# Patient Record
Sex: FEMALE | Race: WHITE | Employment: UNEMPLOYED | URBAN - METROPOLITAN AREA
[De-identification: names, ages, dates, MRNs, and addresses within clinical notes are randomized per-mention and may not be internally consistent; named-entity substitution may affect disease eponyms.]

---

## 2018-07-10 ENCOUNTER — APPOINTMENT (OUTPATIENT)
Dept: ULTRASOUND IMAGING | Age: 58
End: 2018-07-10
Payer: COMMERCIAL

## 2018-07-10 ENCOUNTER — APPOINTMENT (OUTPATIENT)
Dept: GENERAL RADIOLOGY | Age: 58
End: 2018-07-10
Payer: COMMERCIAL

## 2018-07-10 ENCOUNTER — HOSPITAL ENCOUNTER (EMERGENCY)
Age: 58
Discharge: HOME | End: 2018-07-11
Attending: EMERGENCY MEDICINE
Payer: COMMERCIAL

## 2018-07-10 VITALS
BODY MASS INDEX: 34.53 KG/M2 | RESPIRATION RATE: 16 BRPM | OXYGEN SATURATION: 95 % | WEIGHT: 220 LBS | DIASTOLIC BLOOD PRESSURE: 65 MMHG | HEART RATE: 65 BPM | HEIGHT: 67 IN | SYSTOLIC BLOOD PRESSURE: 105 MMHG | TEMPERATURE: 97.7 F

## 2018-07-10 DIAGNOSIS — R60.9 PERIPHERAL EDEMA: Primary | ICD-10-CM

## 2018-07-10 LAB
ANION GAP SERPL CALCULATED.3IONS-SCNC: 15 MMOL/L (ref 7–16)
BUN BLDV-MCNC: 23 MG/DL (ref 6–20)
CALCIUM SERPL-MCNC: 9.7 MG/DL (ref 8.6–10.2)
CHLORIDE BLD-SCNC: 95 MMOL/L (ref 98–107)
CO2: 25 MMOL/L (ref 22–29)
CREAT SERPL-MCNC: 0.7 MG/DL (ref 0.5–1)
EKG ATRIAL RATE: 61 BPM
EKG P AXIS: 58 DEGREES
EKG P-R INTERVAL: 182 MS
EKG Q-T INTERVAL: 432 MS
EKG QRS DURATION: 90 MS
EKG QTC CALCULATION (BAZETT): 434 MS
EKG R AXIS: 7 DEGREES
EKG T AXIS: 32 DEGREES
EKG VENTRICULAR RATE: 61 BPM
GFR AFRICAN AMERICAN: >60
GFR NON-AFRICAN AMERICAN: >60 ML/MIN/1.73
GLUCOSE BLD-MCNC: 183 MG/DL (ref 74–109)
HCT VFR BLD CALC: 42.8 % (ref 34–48)
HEMOGLOBIN: 14.4 G/DL (ref 11.5–15.5)
MCH RBC QN AUTO: 30.2 PG (ref 26–35)
MCHC RBC AUTO-ENTMCNC: 33.6 % (ref 32–34.5)
MCV RBC AUTO: 89.7 FL (ref 80–99.9)
PDW BLD-RTO: 12.5 FL (ref 11.5–15)
PLATELET # BLD: 284 E9/L (ref 130–450)
PMV BLD AUTO: 11.8 FL (ref 7–12)
POTASSIUM SERPL-SCNC: 6.1 MMOL/L (ref 3.5–5)
RBC # BLD: 4.77 E12/L (ref 3.5–5.5)
SODIUM BLD-SCNC: 135 MMOL/L (ref 132–146)
WBC # BLD: 9.4 E9/L (ref 4.5–11.5)

## 2018-07-10 PROCEDURE — 85027 COMPLETE CBC AUTOMATED: CPT

## 2018-07-10 PROCEDURE — 93005 ELECTROCARDIOGRAM TRACING: CPT | Performed by: PHYSICIAN ASSISTANT

## 2018-07-10 PROCEDURE — 99283 EMERGENCY DEPT VISIT LOW MDM: CPT

## 2018-07-10 PROCEDURE — 93971 EXTREMITY STUDY: CPT

## 2018-07-10 PROCEDURE — 71046 X-RAY EXAM CHEST 2 VIEWS: CPT

## 2018-07-10 PROCEDURE — 80048 BASIC METABOLIC PNL TOTAL CA: CPT

## 2018-07-10 PROCEDURE — 96374 THER/PROPH/DIAG INJ IV PUSH: CPT

## 2018-07-10 PROCEDURE — 6360000002 HC RX W HCPCS: Performed by: EMERGENCY MEDICINE

## 2018-07-10 RX ORDER — ARIPIPRAZOLE 10 MG/1
10 TABLET ORAL DAILY
COMMUNITY
End: 2021-03-11

## 2018-07-10 RX ORDER — KETOROLAC TROMETHAMINE 30 MG/ML
15 INJECTION, SOLUTION INTRAMUSCULAR; INTRAVENOUS ONCE
Status: COMPLETED | OUTPATIENT
Start: 2018-07-10 | End: 2018-07-10

## 2018-07-10 RX ORDER — LISINOPRIL 10 MG/1
10 TABLET ORAL DAILY
COMMUNITY
End: 2021-04-05 | Stop reason: ALTCHOICE

## 2018-07-10 RX ADMIN — KETOROLAC TROMETHAMINE 15 MG: 30 INJECTION, SOLUTION INTRAMUSCULAR at 23:48

## 2018-07-10 ASSESSMENT — PAIN SCALES - GENERAL
PAINLEVEL_OUTOF10: 8
PAINLEVEL_OUTOF10: 3

## 2018-07-11 LAB
ANION GAP SERPL CALCULATED.3IONS-SCNC: 15 MMOL/L (ref 7–16)
BUN BLDV-MCNC: 25 MG/DL (ref 6–20)
CALCIUM SERPL-MCNC: 9.7 MG/DL (ref 8.6–10.2)
CHLORIDE BLD-SCNC: 97 MMOL/L (ref 98–107)
CO2: 25 MMOL/L (ref 22–29)
CREAT SERPL-MCNC: 0.8 MG/DL (ref 0.5–1)
GFR AFRICAN AMERICAN: >60
GFR NON-AFRICAN AMERICAN: >60 ML/MIN/1.73
GLUCOSE BLD-MCNC: 164 MG/DL (ref 74–109)
POTASSIUM SERPL-SCNC: 3.3 MMOL/L (ref 3.5–5)
SODIUM BLD-SCNC: 137 MMOL/L (ref 132–146)

## 2018-07-11 PROCEDURE — 6370000000 HC RX 637 (ALT 250 FOR IP): Performed by: EMERGENCY MEDICINE

## 2018-07-11 PROCEDURE — 80048 BASIC METABOLIC PNL TOTAL CA: CPT

## 2018-07-11 RX ORDER — POTASSIUM CHLORIDE 1.5 G/1.77G
40 POWDER, FOR SOLUTION ORAL ONCE
Status: COMPLETED | OUTPATIENT
Start: 2018-07-11 | End: 2018-07-11

## 2018-07-11 RX ADMIN — POTASSIUM CHLORIDE 40 MEQ: 1.5 POWDER, FOR SOLUTION ORAL at 01:16

## 2018-07-11 ASSESSMENT — PAIN SCALES - GENERAL: PAINLEVEL_OUTOF10: 0

## 2018-07-11 ASSESSMENT — PAIN DESCRIPTION - PROGRESSION: CLINICAL_PROGRESSION: RESOLVED

## 2018-07-11 NOTE — ED PROVIDER NOTES
Department of Emergency Medicine   ED  Provider Note  Admit Date/RoomTime: 7/10/2018 10:29 PM  ED Room: 02/02              7/10/18  10:37 PM    History of Present Illness:   Ana Mckeon is a 62 y.o. female presenting to the ED for right leg swelling, beginning a few days ago. The complaint has been persistent, mild in severity, and worsened by nothing. The patient reports that she flew to [facility] several weeks ago and then returned last week. She reports that after arrival to earlier she had bilateral leg swelling that resolved after she was there and walked around. She reports that she flew home and the peripheral edema returned. She reports it resolved and her left leg but her right leg is still swollen. She reports that she has mild pain in her calf as well. She denies fevers or chills. She denies any discoloration of her leg. She denies any neurovascular symptoms such as numbness or tingling or weakness. She denies chest pain or shortness of breath. Review of Systems:   Pertinent positives and negatives are stated within HPI, all other systems reviewed and are negative.          --------------------------------------------- PAST HISTORY ---------------------------------------------  Past Medical History:  has a past medical history of Cancer ([facility] [address] Past Surgical History:  has a past surgical history that includes Mastectomy; Hysterectomy; and  section. Social History:  reports that she has never smoked. She has never used smokeless tobacco. She reports that she drinks alcohol. Family History: family history is not on file. The patients home medications have been reviewed. Allergies: Patient has no known allergies.     -------------------------------------------------- RESULTS -------------------------------------------------  All laboratory and radiology results have been personally reviewed by myself   LABS:  Results for orders placed or performed during the hospital encounter of 18/67/89   Basic metabolic panel   Result Value Ref Range    Sodium 135 132 - 146 mmol/L    Potassium 6.1 (H) 3.5 - 5.0 mmol/L    Chloride 95 (L) 98 - 107 mmol/L    CO2 25 22 - 29 mmol/L    Anion Gap 15 7 - 16 mmol/L    Glucose 183 (H) 74 - 109 mg/dL    BUN 23 (H) 6 - 20 mg/dL    CREATININE 0.7 0.5 - 1.0 mg/dL    GFR Non-African American >60 >=60 mL/min/1.73    GFR African American >60     Calcium 9.7 8.6 - 10.2 mg/dL   CBC   Result Value Ref Range    WBC 9.4 4.5 - 11.5 E9/L    RBC 4.77 3.50 - 5.50 E12/L    Hemoglobin 14.4 11.5 - 15.5 g/dL    Hematocrit 42.8 34.0 - 48.0 %    MCV 89.7 80.0 - 99.9 fL    MCH 30.2 26.0 - 35.0 pg    MCHC 33.6 32.0 - 34.5 %    RDW 12.5 11.5 - 15.0 fL    Platelets 756 868 - 104 E9/L    MPV 11.8 7.0 - 12.0 fL   EKG 12 Lead   Result Value Ref Range    Ventricular Rate 61 BPM    Atrial Rate 61 BPM    P-R Interval 182 ms    QRS Duration 90 ms    Q-T Interval 432 ms    QTc Calculation (Bazett) 434 ms    P Axis 58 degrees    R Axis 7 degrees    T Axis 32 degrees       RADIOLOGY:  Interpreted by Radiologist.  US DUP LOWER EXTREMITY RIGHT SHAUNNA   Final Result      XR CHEST STANDARD (2 VW)   Final Result        EKG Interpretation  Interpreted by emergency department physician, Dr. Shonda Saunders     7/11/18  Time: 2352    Rhythm: normal sinus   Rate: normal  Axis: normal  Conduction: normal  ST Segments: no acute change  T Waves: no acute change    Clinical Impression: Sinus rhythm, no acute ischemic changes    Comparison to Old EKG  No old EKG        ------------------------- NURSING NOTES AND VITALS REVIEWED ---------------------------   The nursing notes within the ED encounter and vital signs as below have been reviewed.    /65   Pulse 65   Temp 97.7 °F (36.5 °C)   Resp 16   Ht 5' 7\" (1.702 m)   Wt 220 lb (99.8 kg)   SpO2 95%   BMI 34.46 kg/m²   Oxygen Saturation Interpretation: Normal      ---------------------------------------------------PHYSICAL EXAM--------------------------------------    Constitutional/General: Alert and oriented x3, well appearing, non toxic in NAD  Head: Normocephalic and atraumatic  Eyes: PERRL, EOMI, conjunctiva normal, sclera non icteric  Mouth: Oropharynx clear, handling secretions  Neck: Supple, full ROM, non tender to palpation in the midline, no stridor, no crepitus, no meningeal signs  Respiratory: Lungs clear to auscultation bilaterally, no wheezes, rales, or rhonchi. Not in respiratory distress  Cardiovascular:  Regular rate. Regular rhythm. No murmurs, gallops, or rubs. 2+ distal pulses  Musculoskeletal: Moves all extremities x 4. Warm and well perfused, no clubbing, cyanosis, or edema. Capillary refill <3 seconds. Mild tenderness to palpation along the right lower leg including the right calf. No palpable cords. There is some very slight swelling compared to the left Neurovascularly intact distally. 2+ DP/PT pulses. Capillary refill <3 secondary. Warm and well perfused. Sural, saphenous, deep peroneal, peroneal, and tibial nerves intact. Sensation intact. 5/5 strength. Achilies tendon intact. No evidence of compartment syndrome. Integument: skin warm and dry. No rashes. Lymphatic: no lymphadenopathy noted  Neurologic: GCS 15, no focal deficits, symmetric strength 5/5 in the upper and lower extremities bilaterally  Psychiatric: Normal Affect      ------------------------------ ED COURSE/MEDICAL DECISION MAKING----------------------  Medications   ketorolac (TORADOL) injection 15 mg (15 mg Intravenous Given 7/10/18 8073)         Medical Decision Making:    US neg for DVT. Likely dependent edema. Advised to elevated the legs and use compression stockings and to return for repeat US if still with sx in the next 7-10 days. Counseling:    The emergency provider has spoken with the patient and spouse/SO and discussed todays results, in addition to providing specific details for the plan of care and counseling regarding the diagnosis and prognosis. Questions are answered at this time and they are agreeable with the plan.      --------------------------------- IMPRESSION AND DISPOSITION ---------------------------------    IMPRESSION  1.  Peripheral edema        DISPOSITION  Disposition: Discharge to home  Patient condition is good                Belkis Li MD  07/11/18 0918

## 2021-03-08 ENCOUNTER — IMMUNIZATION (OUTPATIENT)
Dept: PRIMARY CARE CLINIC | Age: 61
End: 2021-03-08
Payer: COMMERCIAL

## 2021-03-08 PROCEDURE — 0031A COVID-19, J&J VACCINE, PF, 0.5 ML DOSE: CPT | Performed by: NURSE PRACTITIONER

## 2021-03-08 PROCEDURE — 91303 COVID-19, J&J VACCINE, PF, 0.5 ML DOSE: CPT | Performed by: NURSE PRACTITIONER

## 2021-03-11 ENCOUNTER — APPOINTMENT (OUTPATIENT)
Dept: CT IMAGING | Age: 61
End: 2021-03-11
Payer: COMMERCIAL

## 2021-03-11 ENCOUNTER — HOSPITAL ENCOUNTER (EMERGENCY)
Age: 61
Discharge: HOME | End: 2021-03-12
Attending: EMERGENCY MEDICINE
Payer: COMMERCIAL

## 2021-03-11 DIAGNOSIS — N10 ACUTE PYELONEPHRITIS: Primary | ICD-10-CM

## 2021-03-11 LAB
ALBUMIN SERPL-MCNC: 4.4 G/DL (ref 3.5–5.2)
ALP BLD-CCNC: 100 U/L (ref 35–104)
ALT SERPL-CCNC: 14 U/L (ref 0–32)
ANION GAP SERPL CALCULATED.3IONS-SCNC: 7 MMOL/L (ref 7–16)
AST SERPL-CCNC: 15 U/L (ref 0–31)
BASOPHILS ABSOLUTE: 0.04 E9/L (ref 0–0.2)
BASOPHILS RELATIVE PERCENT: 0.4 % (ref 0–2)
BILIRUB SERPL-MCNC: 0.2 MG/DL (ref 0–1.2)
BUN BLDV-MCNC: 22 MG/DL (ref 8–23)
CALCIUM SERPL-MCNC: 9.5 MG/DL (ref 8.6–10.2)
CHLORIDE BLD-SCNC: 104 MMOL/L (ref 98–107)
CO2: 29 MMOL/L (ref 22–29)
CREAT SERPL-MCNC: 1.3 MG/DL (ref 0.5–1)
EOSINOPHILS ABSOLUTE: 0.16 E9/L (ref 0.05–0.5)
EOSINOPHILS RELATIVE PERCENT: 1.8 % (ref 0–6)
GFR AFRICAN AMERICAN: 50
GFR NON-AFRICAN AMERICAN: 42 ML/MIN/1.73
GLUCOSE BLD-MCNC: 122 MG/DL (ref 74–99)
HCT VFR BLD CALC: 42.3 % (ref 34–48)
HEMOGLOBIN: 13.5 G/DL (ref 11.5–15.5)
IMMATURE GRANULOCYTES #: 0.01 E9/L
IMMATURE GRANULOCYTES %: 0.1 % (ref 0–5)
LYMPHOCYTES ABSOLUTE: 3.01 E9/L (ref 1.5–4)
LYMPHOCYTES RELATIVE PERCENT: 33.4 % (ref 20–42)
MCH RBC QN AUTO: 28.7 PG (ref 26–35)
MCHC RBC AUTO-ENTMCNC: 31.9 % (ref 32–34.5)
MCV RBC AUTO: 90 FL (ref 80–99.9)
MONOCYTES ABSOLUTE: 1.24 E9/L (ref 0.1–0.95)
MONOCYTES RELATIVE PERCENT: 13.8 % (ref 2–12)
NEUTROPHILS ABSOLUTE: 4.55 E9/L (ref 1.8–7.3)
NEUTROPHILS RELATIVE PERCENT: 50.5 % (ref 43–80)
PDW BLD-RTO: 12.3 FL (ref 11.5–15)
PLATELET # BLD: 261 E9/L (ref 130–450)
PMV BLD AUTO: 11.5 FL (ref 7–12)
POTASSIUM SERPL-SCNC: 3.6 MMOL/L (ref 3.5–5)
RBC # BLD: 4.7 E12/L (ref 3.5–5.5)
SODIUM BLD-SCNC: 140 MMOL/L (ref 132–146)
TOTAL PROTEIN: 7 G/DL (ref 6.4–8.3)
WBC # BLD: 9 E9/L (ref 4.5–11.5)

## 2021-03-11 PROCEDURE — 2580000003 HC RX 258: Performed by: EMERGENCY MEDICINE

## 2021-03-11 PROCEDURE — 85025 COMPLETE CBC W/AUTO DIFF WBC: CPT

## 2021-03-11 PROCEDURE — 81001 URINALYSIS AUTO W/SCOPE: CPT

## 2021-03-11 PROCEDURE — 99283 EMERGENCY DEPT VISIT LOW MDM: CPT

## 2021-03-11 PROCEDURE — 87088 URINE BACTERIA CULTURE: CPT

## 2021-03-11 PROCEDURE — 6360000002 HC RX W HCPCS: Performed by: EMERGENCY MEDICINE

## 2021-03-11 PROCEDURE — 80053 COMPREHEN METABOLIC PANEL: CPT

## 2021-03-11 PROCEDURE — 74176 CT ABD & PELVIS W/O CONTRAST: CPT

## 2021-03-11 RX ORDER — 0.9 % SODIUM CHLORIDE 0.9 %
1000 INTRAVENOUS SOLUTION INTRAVENOUS ONCE
Status: COMPLETED | OUTPATIENT
Start: 2021-03-12 | End: 2021-03-12

## 2021-03-11 RX ORDER — NITROFURANTOIN 25; 75 MG/1; MG/1
100 CAPSULE ORAL 2 TIMES DAILY
COMMUNITY
End: 2021-04-05 | Stop reason: ALTCHOICE

## 2021-03-11 RX ADMIN — CEFTRIAXONE 1000 MG: 1 INJECTION, POWDER, FOR SOLUTION INTRAMUSCULAR; INTRAVENOUS at 23:59

## 2021-03-11 ASSESSMENT — PAIN DESCRIPTION - DESCRIPTORS: DESCRIPTORS: DULL

## 2021-03-11 ASSESSMENT — PAIN DESCRIPTION - ORIENTATION: ORIENTATION: RIGHT

## 2021-03-11 ASSESSMENT — ENCOUNTER SYMPTOMS
SHORTNESS OF BREATH: 0
NAUSEA: 0
BACK PAIN: 0
ABDOMINAL PAIN: 0
BLOOD IN STOOL: 0
COUGH: 0
COLOR CHANGE: 0
RHINORRHEA: 0
VOMITING: 0

## 2021-03-11 ASSESSMENT — PAIN DESCRIPTION - LOCATION: LOCATION: FLANK

## 2021-03-12 VITALS
TEMPERATURE: 98.4 F | BODY MASS INDEX: 31.55 KG/M2 | DIASTOLIC BLOOD PRESSURE: 59 MMHG | RESPIRATION RATE: 16 BRPM | HEIGHT: 67 IN | SYSTOLIC BLOOD PRESSURE: 157 MMHG | HEART RATE: 67 BPM | OXYGEN SATURATION: 96 % | WEIGHT: 201 LBS

## 2021-03-12 LAB
BACTERIA: ABNORMAL /HPF
BILIRUBIN URINE: ABNORMAL
BLOOD, URINE: NEGATIVE
CLARITY: CLEAR
COLOR: ABNORMAL
EPITHELIAL CELLS, UA: ABNORMAL /HPF
GLUCOSE URINE: 100 MG/DL
KETONES, URINE: ABNORMAL MG/DL
LEUKOCYTE ESTERASE, URINE: NEGATIVE
NITRITE, URINE: NEGATIVE
PH UA: 5 (ref 5–9)
PROTEIN UA: ABNORMAL MG/DL
RBC UA: ABNORMAL /HPF (ref 0–2)
SPECIFIC GRAVITY UA: 1.02 (ref 1–1.03)
UROBILINOGEN, URINE: 2 E.U./DL
WBC UA: ABNORMAL /HPF (ref 0–5)

## 2021-03-12 PROCEDURE — 96365 THER/PROPH/DIAG IV INF INIT: CPT

## 2021-03-12 PROCEDURE — 2580000003 HC RX 258: Performed by: EMERGENCY MEDICINE

## 2021-03-12 RX ORDER — CEFDINIR 300 MG/1
300 CAPSULE ORAL 2 TIMES DAILY
Qty: 28 CAPSULE | Refills: 0 | Status: SHIPPED | OUTPATIENT
Start: 2021-03-12 | End: 2021-03-26

## 2021-03-12 RX ADMIN — SODIUM CHLORIDE 1000 ML: 9 INJECTION, SOLUTION INTRAVENOUS at 00:05

## 2021-03-12 NOTE — ED PROVIDER NOTES
ED PROVIDER NOTE    Chief Complaint   Patient presents with    Flank Pain     Been treated on Tuesday and getting worse    Urinary Tract Infection       HPI:  3/11/21,   Time: 9:55 PM ROMAINE Vo is a 61 y.o. female presenting to the ED for flank pain. Gradual onset x 3d, progressively worsening, R>L, dull ache, constant, nonradiating, no aggravating/alleviating factors. Associated dysuria, urgency, frequency. Treated w/ macrobid by PCP for UTI, took it x 2 days, no improvement. Also taking azo. No fever, chills, nausea, vomiting. Normal po intake. No decreased urine output. Chart review: hx of HTN, breast ca, anxiety, hysterectomy,     Review of Systems:     Review of Systems   Constitutional: Negative for appetite change, chills and fever. HENT: Negative for congestion and rhinorrhea. Eyes: Negative for visual disturbance. Respiratory: Negative for cough and shortness of breath. Cardiovascular: Negative for chest pain. Gastrointestinal: Negative for abdominal pain, blood in stool, nausea and vomiting. Genitourinary: Positive for dysuria, flank pain, frequency and urgency. Negative for decreased urine volume, difficulty urinating and hematuria. Musculoskeletal: Negative for back pain and neck pain. Skin: Negative for color change.    Neurological: Negative for dizziness, syncope, weakness, light-headedness, numbness and headaches.         --------------------------------------------- PAST HISTORY ---------------------------------------------  Past Medical History:   Past Medical History:   Diagnosis Date    Cancer [facility])     breast cancer       Past Surgical History:   Past Surgical History:   Procedure Laterality Date     SECTION      HYSTERECTOMY      JOINT REPLACEMENT Left 2020    MASTECTOMY         Social History:   Social History     Socioeconomic History    Marital status:      Spouse name: None    Number of children: None    Years of education: None    Highest education level: None   Occupational History    None   Social Needs    Financial resource strain: None    Food insecurity     Worry: None     Inability: None    Transportation needs     Medical: None     Non-medical: None   Tobacco Use    Smoking status: Never Smoker    Smokeless tobacco: Never Used   Substance and Sexual Activity    Alcohol use: Yes    Drug use: Never    Sexual activity: None   Lifestyle    Physical activity     Days per week: None     Minutes per session: None    Stress: None   Relationships    Social connections     Talks on phone: None     Gets together: None     Attends Spiritism service: None     Active member of club or organization: None     Attends meetings of clubs or organizations: None     Relationship status: None    Intimate partner violence     Fear of current or ex partner: None     Emotionally abused: None     Physically abused: None     Forced sexual activity: None   Other Topics Concern    None   Social History Narrative    None       Family History:   History reviewed. No pertinent family history. The patients home medications have been reviewed. Allergies: Allergies   Allergen Reactions    Oxycodone      nausea, nightmares           ---------------------------------------------------PHYSICAL EXAM--------------------------------------    /70   Pulse 62   Temp 97.3 °F (36.3 °C) (Infrared)   Resp 16   Ht 5' 7\" (1.702 m)   Wt 201 lb (91.2 kg)   SpO2 98%   BMI 31.48 kg/m²     Physical Exam  Vitals signs and nursing note reviewed. Constitutional:       General: She is not in acute distress. Appearance: She is not toxic-appearing. HENT:      Mouth/Throat:      Mouth: Mucous membranes are moist.   Eyes:      General: No scleral icterus. Extraocular Movements: Extraocular movements intact. Pupils: Pupils are equal, round, and reactive to light.    Neck:      Musculoskeletal: Normal range of motion and neck supple. Cardiovascular:      Rate and Rhythm: Normal rate and regular rhythm. Pulses: Normal pulses. Heart sounds: Normal heart sounds. No murmur. Pulmonary:      Effort: Pulmonary effort is normal. No respiratory distress. Breath sounds: Normal breath sounds. No wheezing or rales. Abdominal:      General: There is no distension. Palpations: Abdomen is soft. Tenderness: There is no abdominal tenderness. There is right CVA tenderness. There is no left CVA tenderness, guarding or rebound. Musculoskeletal: Normal range of motion. General: No swelling or tenderness. Comments: Radial, DP, and PT pulses intact bilaterally. Skin:     General: Skin is warm and dry. Neurological:      Mental Status: She is alert and oriented to person, place, and time. Comments: Strength 5/5 and sensation grossly intact to light touch and equal bilaterally throughout all extremities            -------------------------------------------------- RESULTS -------------------------------------------------  I have personally reviewed all laboratory and imaging results for this patient. Results are listed below.      LABS:  Labs Reviewed   URINALYSIS WITH MICROSCOPIC - Abnormal; Notable for the following components:       Result Value    Color, UA DARK YELLOW (*)     Glucose, Ur 100 (*)     Bilirubin Urine SMALL (*)     Ketones, Urine TRACE (*)     Urobilinogen, Urine 2.0 (*)     Nitrite, Urine POSITIVE (*)     All other components within normal limits   CBC WITH AUTO DIFFERENTIAL - Abnormal; Notable for the following components:    MCHC 31.9 (*)     Monocytes % 13.8 (*)     Monocytes Absolute 1.24 (*)     All other components within normal limits   COMPREHENSIVE METABOLIC PANEL - Abnormal; Notable for the following components:    Glucose 122 (*)     CREATININE 1.3 (*)     All other components within normal limits   CULTURE, URINE       RADIOLOGY:  Interpreted personally and by Radiologist.  [address] ABDOMEN PELVIS WO CONTRAST Additional Contrast? None   Final Result   Dilated appendix with no other signs of inflammation, which can be seen in   early appendicitis. Please correlate clinically and consider PO and IV   contrast enhanced study if clinically appropriate. Multiple hepatic cysts. Right breast implant. No evidence of renal stones or obstructive uropathy.                 ------------------------- NURSING NOTES AND VITALS REVIEWED ---------------------------   The nursing notes within the ED encounter and vital signs as below have been reviewed by myself. /70   Pulse 62   Temp 97.3 °F (36.3 °C) (Infrared)   Resp 16   Ht 5' 7\" (1.702 m)   Wt 201 lb (91.2 kg)   SpO2 98%   BMI 31.48 kg/m²   Oxygen Saturation Interpretation: Normal    The patients available past medical records and past encounters were reviewed. ------------------------------ ED COURSE/MEDICAL DECISION MAKING----------------------  Medications   cefTRIAXone (ROCEPHIN) 1,000 mg in sterile water 10 mL IV syringe (has no administration in time range)   0.9 % sodium chloride bolus (has no administration in time range)     Counseling: The emergency provider has spoken with the patient and discussed todays results, in addition to providing specific details for the plan of care and counseling regarding the diagnosis and prognosis. Questions are answered at this time and they are agreeable with the plan. ED Course/Medical Decision Makin y.o. female here with flank pain in setting of UTI treated as outpatient w/ macrobid. Non-toxic appearing, afebrile, hemodynamically stable, and in no acute distress. UA not strongly suggestive of UTI, maybe partially treated by abx. Mild DARLENE. No leukocytosis. R flank/CVA tenderness. No abdominal tenderness. CT shows no obstructive uropathy, does show dilated appendix w/o signs of inflammation. Reevaluation still no abdominal tenderness to palpation.  No fever or leukocytosis. Discussed CT findings with patient and she understands that this could be early appendicitis. However at this time she wishes to go home and will return for new or worsening symptoms. Gave IV ceftriaxone and IV fluids. After discussion of findings and return precautions, patient agrees with plan for discharge and outpatient follow up with PCP. Rx cefdinir.       --------------------------------- IMPRESSION AND DISPOSITION ---------------------------------    IMPRESSION  1. Acute pyelonephritis        DISPOSITION  Disposition: Discharge to home  Patient condition is good    NOTE: This report was transcribed using voice recognition software.  Every effort was made to ensure accuracy; however, inadvertent computerized transcription errors may be present    Elmira Caban MD  Attending Emergency Physician          Elmira Caban MD  03/12/21 5756

## 2021-03-14 LAB — URINE CULTURE, ROUTINE: NORMAL

## 2021-04-05 ENCOUNTER — APPOINTMENT (OUTPATIENT)
Dept: CT IMAGING | Age: 61
End: 2021-04-05
Payer: COMMERCIAL

## 2021-04-05 ENCOUNTER — HOSPITAL ENCOUNTER (OUTPATIENT)
Age: 61
Setting detail: OBSERVATION
Discharge: HOME | End: 2021-04-06
Attending: EMERGENCY MEDICINE | Admitting: INTERNAL MEDICINE
Payer: COMMERCIAL

## 2021-04-05 ENCOUNTER — APPOINTMENT (OUTPATIENT)
Dept: MRI IMAGING | Age: 61
End: 2021-04-05
Payer: COMMERCIAL

## 2021-04-05 DIAGNOSIS — R20.0 NUMBNESS: ICD-10-CM

## 2021-04-05 DIAGNOSIS — R29.90 STROKE-LIKE SYMPTOM: Primary | ICD-10-CM

## 2021-04-05 LAB
ALBUMIN SERPL-MCNC: 4.7 G/DL (ref 3.5–5.2)
ALP BLD-CCNC: 101 U/L (ref 35–104)
ALT SERPL-CCNC: 14 U/L (ref 0–32)
ANION GAP SERPL CALCULATED.3IONS-SCNC: 9 MMOL/L (ref 7–16)
AST SERPL-CCNC: 18 U/L (ref 0–31)
BASOPHILS ABSOLUTE: 0.04 E9/L (ref 0–0.2)
BASOPHILS RELATIVE PERCENT: 0.7 % (ref 0–2)
BILIRUB SERPL-MCNC: 0.4 MG/DL (ref 0–1.2)
BUN BLDV-MCNC: 16 MG/DL (ref 8–23)
CALCIUM SERPL-MCNC: 10.1 MG/DL (ref 8.6–10.2)
CHLORIDE BLD-SCNC: 102 MMOL/L (ref 98–107)
CHOLESTEROL, TOTAL: 221 MG/DL (ref 0–199)
CHP ED QC CHECK: YES
CO2: 28 MMOL/L (ref 22–29)
CREAT SERPL-MCNC: 0.7 MG/DL (ref 0.5–1)
EOSINOPHILS ABSOLUTE: 0.19 E9/L (ref 0.05–0.5)
EOSINOPHILS RELATIVE PERCENT: 3.2 % (ref 0–6)
GFR AFRICAN AMERICAN: >60
GFR NON-AFRICAN AMERICAN: >60 ML/MIN/1.73
GLUCOSE BLD-MCNC: 100 MG/DL
GLUCOSE BLD-MCNC: 102 MG/DL (ref 74–99)
HBA1C MFR BLD: 5.6 % (ref 4–5.6)
HCT VFR BLD CALC: 45.1 % (ref 34–48)
HDLC SERPL-MCNC: 68 MG/DL
HEMOGLOBIN: 14.2 G/DL (ref 11.5–15.5)
IMMATURE GRANULOCYTES #: 0.01 E9/L
IMMATURE GRANULOCYTES %: 0.2 % (ref 0–5)
LACTIC ACID: 1.4 MMOL/L (ref 0.5–2.2)
LDL CHOLESTEROL CALCULATED: 129 MG/DL (ref 0–99)
LYMPHOCYTES ABSOLUTE: 2.53 E9/L (ref 1.5–4)
LYMPHOCYTES RELATIVE PERCENT: 42.9 % (ref 20–42)
MCH RBC QN AUTO: 28.5 PG (ref 26–35)
MCHC RBC AUTO-ENTMCNC: 31.5 % (ref 32–34.5)
MCV RBC AUTO: 90.6 FL (ref 80–99.9)
METER GLUCOSE: 100 MG/DL (ref 74–99)
MONOCYTES ABSOLUTE: 0.69 E9/L (ref 0.1–0.95)
MONOCYTES RELATIVE PERCENT: 11.7 % (ref 2–12)
NEUTROPHILS ABSOLUTE: 2.44 E9/L (ref 1.8–7.3)
NEUTROPHILS RELATIVE PERCENT: 41.3 % (ref 43–80)
PDW BLD-RTO: 12.5 FL (ref 11.5–15)
PLATELET # BLD: 311 E9/L (ref 130–450)
PMV BLD AUTO: 11.6 FL (ref 7–12)
POTASSIUM REFLEX MAGNESIUM: 4 MMOL/L (ref 3.5–5)
RBC # BLD: 4.98 E12/L (ref 3.5–5.5)
SODIUM BLD-SCNC: 139 MMOL/L (ref 132–146)
TOTAL PROTEIN: 7.8 G/DL (ref 6.4–8.3)
TRIGL SERPL-MCNC: 118 MG/DL (ref 0–149)
VLDLC SERPL CALC-MCNC: 24 MG/DL
WBC # BLD: 5.9 E9/L (ref 4.5–11.5)

## 2021-04-05 PROCEDURE — 70496 CT ANGIOGRAPHY HEAD: CPT

## 2021-04-05 PROCEDURE — 96375 TX/PRO/DX INJ NEW DRUG ADDON: CPT

## 2021-04-05 PROCEDURE — 85025 COMPLETE CBC W/AUTO DIFF WBC: CPT

## 2021-04-05 PROCEDURE — 92610 EVALUATE SWALLOWING FUNCTION: CPT | Performed by: SPEECH-LANGUAGE PATHOLOGIST

## 2021-04-05 PROCEDURE — 99283 EMERGENCY DEPT VISIT LOW MDM: CPT

## 2021-04-05 PROCEDURE — 6370000000 HC RX 637 (ALT 250 FOR IP): Performed by: PSYCHIATRY & NEUROLOGY

## 2021-04-05 PROCEDURE — APPSS45 APP SPLIT SHARED TIME 31-45 MINUTES: Performed by: NURSE PRACTITIONER

## 2021-04-05 PROCEDURE — 6370000000 HC RX 637 (ALT 250 FOR IP): Performed by: NURSE PRACTITIONER

## 2021-04-05 PROCEDURE — 70498 CT ANGIOGRAPHY NECK: CPT

## 2021-04-05 PROCEDURE — 2580000003 HC RX 258: Performed by: STUDENT IN AN ORGANIZED HEALTH CARE EDUCATION/TRAINING PROGRAM

## 2021-04-05 PROCEDURE — 99226 PR SBSQ OBSERVATION CARE/DAY 35 MINUTES: CPT | Performed by: PSYCHIATRY & NEUROLOGY

## 2021-04-05 PROCEDURE — 6360000002 HC RX W HCPCS: Performed by: NURSE PRACTITIONER

## 2021-04-05 PROCEDURE — G0378 HOSPITAL OBSERVATION PER HR: HCPCS

## 2021-04-05 PROCEDURE — 83605 ASSAY OF LACTIC ACID: CPT

## 2021-04-05 PROCEDURE — 96374 THER/PROPH/DIAG INJ IV PUSH: CPT

## 2021-04-05 PROCEDURE — 96372 THER/PROPH/DIAG INJ SC/IM: CPT

## 2021-04-05 PROCEDURE — 82962 GLUCOSE BLOOD TEST: CPT

## 2021-04-05 PROCEDURE — A9577 INJ MULTIHANCE: HCPCS | Performed by: RADIOLOGY

## 2021-04-05 PROCEDURE — 6360000004 HC RX CONTRAST MEDICATION: Performed by: RADIOLOGY

## 2021-04-05 PROCEDURE — 70553 MRI BRAIN STEM W/O & W/DYE: CPT

## 2021-04-05 PROCEDURE — 70450 CT HEAD/BRAIN W/O DYE: CPT

## 2021-04-05 PROCEDURE — 80053 COMPREHEN METABOLIC PANEL: CPT

## 2021-04-05 PROCEDURE — 6360000002 HC RX W HCPCS: Performed by: STUDENT IN AN ORGANIZED HEALTH CARE EDUCATION/TRAINING PROGRAM

## 2021-04-05 PROCEDURE — 83036 HEMOGLOBIN GLYCOSYLATED A1C: CPT

## 2021-04-05 PROCEDURE — 99219 PR INITIAL OBSERVATION CARE/DAY 50 MINUTES: CPT | Performed by: FAMILY MEDICINE

## 2021-04-05 PROCEDURE — 80061 LIPID PANEL: CPT

## 2021-04-05 PROCEDURE — 2500000003 HC RX 250 WO HCPCS: Performed by: STUDENT IN AN ORGANIZED HEALTH CARE EDUCATION/TRAINING PROGRAM

## 2021-04-05 PROCEDURE — 92526 ORAL FUNCTION THERAPY: CPT | Performed by: SPEECH-LANGUAGE PATHOLOGIST

## 2021-04-05 PROCEDURE — 2580000003 HC RX 258: Performed by: NURSE PRACTITIONER

## 2021-04-05 RX ORDER — SODIUM CHLORIDE 0.9 % (FLUSH) 0.9 %
10 SYRINGE (ML) INJECTION PRN
Status: DISCONTINUED | OUTPATIENT
Start: 2021-04-05 | End: 2021-04-06 | Stop reason: HOSPADM

## 2021-04-05 RX ORDER — ASPIRIN 81 MG/1
81 TABLET, CHEWABLE ORAL DAILY
Status: DISCONTINUED | OUTPATIENT
Start: 2021-04-05 | End: 2021-04-06

## 2021-04-05 RX ORDER — SODIUM CHLORIDE 9 MG/ML
25 INJECTION, SOLUTION INTRAVENOUS PRN
Status: DISCONTINUED | OUTPATIENT
Start: 2021-04-05 | End: 2021-04-06 | Stop reason: HOSPADM

## 2021-04-05 RX ORDER — SODIUM CHLORIDE 0.9 % (FLUSH) 0.9 %
10 SYRINGE (ML) INJECTION EVERY 12 HOURS SCHEDULED
Status: DISCONTINUED | OUTPATIENT
Start: 2021-04-05 | End: 2021-04-06 | Stop reason: HOSPADM

## 2021-04-05 RX ORDER — CLOPIDOGREL BISULFATE 75 MG/1
75 TABLET ORAL DAILY
Status: DISCONTINUED | OUTPATIENT
Start: 2021-04-06 | End: 2021-04-06

## 2021-04-05 RX ORDER — PROMETHAZINE HYDROCHLORIDE 25 MG/1
12.5 TABLET ORAL EVERY 6 HOURS PRN
Status: DISCONTINUED | OUTPATIENT
Start: 2021-04-05 | End: 2021-04-06 | Stop reason: HOSPADM

## 2021-04-05 RX ORDER — ATORVASTATIN CALCIUM 40 MG/1
80 TABLET, FILM COATED ORAL NIGHTLY
Status: DISCONTINUED | OUTPATIENT
Start: 2021-04-05 | End: 2021-04-06

## 2021-04-05 RX ORDER — ONDANSETRON 2 MG/ML
4 INJECTION INTRAMUSCULAR; INTRAVENOUS EVERY 6 HOURS PRN
Status: DISCONTINUED | OUTPATIENT
Start: 2021-04-05 | End: 2021-04-06 | Stop reason: HOSPADM

## 2021-04-05 RX ORDER — ACETAMINOPHEN 650 MG/1
650 SUPPOSITORY RECTAL EVERY 6 HOURS PRN
Status: DISCONTINUED | OUTPATIENT
Start: 2021-04-05 | End: 2021-04-06 | Stop reason: HOSPADM

## 2021-04-05 RX ORDER — FLUOXETINE HYDROCHLORIDE 20 MG/1
60 CAPSULE ORAL DAILY
Status: DISCONTINUED | OUTPATIENT
Start: 2021-04-05 | End: 2021-04-06 | Stop reason: HOSPADM

## 2021-04-05 RX ORDER — LISINOPRIL AND HYDROCHLOROTHIAZIDE 12.5; 1 MG/1; MG/1
1 TABLET ORAL DAILY
Status: DISCONTINUED | OUTPATIENT
Start: 2021-04-05 | End: 2021-04-05 | Stop reason: CLARIF

## 2021-04-05 RX ORDER — ACETAMINOPHEN 325 MG/1
650 TABLET ORAL EVERY 6 HOURS PRN
Status: DISCONTINUED | OUTPATIENT
Start: 2021-04-05 | End: 2021-04-06 | Stop reason: HOSPADM

## 2021-04-05 RX ORDER — POLYETHYLENE GLYCOL 3350 17 G/17G
17 POWDER, FOR SOLUTION ORAL DAILY PRN
Status: DISCONTINUED | OUTPATIENT
Start: 2021-04-05 | End: 2021-04-06 | Stop reason: HOSPADM

## 2021-04-05 RX ORDER — LISINOPRIL AND HYDROCHLOROTHIAZIDE 12.5; 1 MG/1; MG/1
1 TABLET ORAL DAILY
COMMUNITY

## 2021-04-05 RX ORDER — DIPHENHYDRAMINE HYDROCHLORIDE 50 MG/ML
25 INJECTION INTRAMUSCULAR; INTRAVENOUS ONCE
Status: COMPLETED | OUTPATIENT
Start: 2021-04-05 | End: 2021-04-05

## 2021-04-05 RX ORDER — CLOPIDOGREL BISULFATE 75 MG/1
225 TABLET ORAL ONCE
Status: COMPLETED | OUTPATIENT
Start: 2021-04-05 | End: 2021-04-05

## 2021-04-05 RX ORDER — LISINOPRIL 10 MG/1
10 TABLET ORAL DAILY
Status: DISCONTINUED | OUTPATIENT
Start: 2021-04-05 | End: 2021-04-06 | Stop reason: HOSPADM

## 2021-04-05 RX ORDER — HYDROCHLOROTHIAZIDE 12.5 MG/1
12.5 TABLET ORAL DAILY
Status: DISCONTINUED | OUTPATIENT
Start: 2021-04-05 | End: 2021-04-06 | Stop reason: HOSPADM

## 2021-04-05 RX ORDER — METHYLPREDNISOLONE SODIUM SUCCINATE 125 MG/2ML
125 INJECTION, POWDER, LYOPHILIZED, FOR SOLUTION INTRAMUSCULAR; INTRAVENOUS ONCE
Status: COMPLETED | OUTPATIENT
Start: 2021-04-05 | End: 2021-04-05

## 2021-04-05 RX ORDER — 0.9 % SODIUM CHLORIDE 0.9 %
500 INTRAVENOUS SOLUTION INTRAVENOUS ONCE
Status: COMPLETED | OUTPATIENT
Start: 2021-04-05 | End: 2021-04-05

## 2021-04-05 RX ADMIN — ASPIRIN 81 MG CHEWABLE TABLET 81 MG: 81 TABLET CHEWABLE at 15:59

## 2021-04-05 RX ADMIN — Medication 10 ML: at 20:41

## 2021-04-05 RX ADMIN — LISINOPRIL 10 MG: 10 TABLET ORAL at 16:05

## 2021-04-05 RX ADMIN — GADOBENATE DIMEGLUMINE 20 ML: 529 INJECTION, SOLUTION INTRAVENOUS at 18:00

## 2021-04-05 RX ADMIN — DIPHENHYDRAMINE HYDROCHLORIDE 25 MG: 50 INJECTION, SOLUTION INTRAMUSCULAR; INTRAVENOUS at 10:30

## 2021-04-05 RX ADMIN — METHYLPREDNISOLONE SODIUM SUCCINATE 125 MG: 125 INJECTION, POWDER, FOR SOLUTION INTRAMUSCULAR; INTRAVENOUS at 10:30

## 2021-04-05 RX ADMIN — CLOPIDOGREL BISULFATE 225 MG: 75 TABLET ORAL at 15:59

## 2021-04-05 RX ADMIN — FLUOXETINE 60 MG: 20 CAPSULE ORAL at 15:58

## 2021-04-05 RX ADMIN — SODIUM CHLORIDE 500 ML: 9 INJECTION, SOLUTION INTRAVENOUS at 10:30

## 2021-04-05 RX ADMIN — HYDROCHLOROTHIAZIDE 12.5 MG: 12.5 TABLET ORAL at 15:59

## 2021-04-05 RX ADMIN — IOPAMIDOL 80 ML: 755 INJECTION, SOLUTION INTRAVENOUS at 11:19

## 2021-04-05 RX ADMIN — ENOXAPARIN SODIUM 40 MG: 40 INJECTION SUBCUTANEOUS at 15:59

## 2021-04-05 RX ADMIN — ATORVASTATIN CALCIUM 80 MG: 40 TABLET, FILM COATED ORAL at 20:40

## 2021-04-05 RX ADMIN — FAMOTIDINE 20 MG: 10 INJECTION, SOLUTION INTRAVENOUS at 10:30

## 2021-04-05 ASSESSMENT — ENCOUNTER SYMPTOMS
SINUS PRESSURE: 0
VOICE CHANGE: 0
EYE REDNESS: 0
BACK PAIN: 0
WHEEZING: 0
COUGH: 0
EYE PAIN: 0
SHORTNESS OF BREATH: 0
ABDOMINAL DISTENTION: 0
NAUSEA: 0
VOMITING: 0
GASTROINTESTINAL NEGATIVE: 1
EYE DISCHARGE: 0
RESPIRATORY NEGATIVE: 1
ALLERGIC/IMMUNOLOGIC NEGATIVE: 1
DIARRHEA: 0
SORE THROAT: 0
ABDOMINAL PAIN: 0
EYES NEGATIVE: 1
TROUBLE SWALLOWING: 0

## 2021-04-05 ASSESSMENT — PAIN SCALES - GENERAL
PAINLEVEL_OUTOF10: 0
PAINLEVEL_OUTOF10: 0

## 2021-04-05 NOTE — ED NOTES
[facility] faxed. Spoke to Viktor Flaherty who stated she received it. Pt ready.      Dinora Matos RN  04/05/21 3965

## 2021-04-05 NOTE — PROGRESS NOTES
[facility] Quality Flow/Interdisciplinary Rounds Progress Note        Quality Flow Rounds held on April 5, 2021    Disciplines Attending:  Bedside Nurse, ,  and Nursing Unit Leadership    Candice RENAE Oneil Cloud was admitted on 4/5/2021  9:52 AM    Anticipated Discharge Date:  Expected Discharge Date: 04/08/21    Disposition:    David Score:  David Scale Score: 22    Readmission Risk              Risk of Unplanned Readmission:        0           Discussed patient goal for the day, patient clinical progression, and barriers to discharge. The following Goal(s) of the Day/Commitment(s) have been identified:  Patient admitted today consults placed to Neurology, ENT, & speech. Mri ordered.     Edmar Ying  April 5, 2021

## 2021-04-05 NOTE — CONSULTS
NEUROLOGY CONSULT NOTE       Requesting Physician: Kyle Platt MD     Reason for Consult:  Evaluate for \"strokelike symptoms versus Bell's palsy\"    History of Present Illness:  Nica Hoang is a 61 y.o. female admitted to [address] [place] [address] on 2021. Patient developed paresthesias around her mouth a few days ago. This involves the lips bilaterally and the tongue. Apparently some weakness as well as she she had trouble pursing her lips to sip a couple coffee and she was having trouble manipulating her tongue within her mouth. No symptoms higher in the face. She has had some paresthesias of the upper extremities more on the left than the right for a few weeks. No symptoms in the lower extremities and no gait difficulties. No vertigo. No dysphagia.  feels her speech may not be quite as clear as usual.  No headache. Symptoms have been fairly constant since onset and have perhaps worsened somewhat since onset.     Past Medical History:        Diagnosis Date    Cancer [facility])     breast cancer   Hypertension  Breast cancer was many years ago        Procedure Laterality Date     SECTION      HYSTERECTOMY      JOINT REPLACEMENT Left 2020    MASTECTOMY         Social History:  Social History     Tobacco Use   Smoking Status Former Smoker    Packs/day: 0.10    Types: Cigarettes    Start date: 1976   Keisha Hernandez Quit date: 1981    Years since quittin.0   Smokeless Tobacco Never Used     Social History     Substance and Sexual Activity   Alcohol Use Yes    Comment: rare     Social History     Substance and Sexual Activity   Drug Use Never         Family History:       Problem Relation Age of Onset    High Blood Pressure Mother     Diabetes Mother     Lung Cancer Father     Breast Cancer Sister     No Known Problems Brother        Review of Systems:  CONSTITUTIONAL:  negative for fever or recent illness  EYE:  No recent visual changes  ENT:  negative for dysphagia  RESPIRATORY:  negative for dyspnea  CARDIOVASCULAR:  negative for chest pain  GASTROINTESTINAL: Reflux  HEMATOLOGIC/LYMPHATIC:  negative for unusual bleeding  MUSCULOSKELETAL: Getting therapy for left total knee arthroplasty some months ago  BEHAVIOR/PSYCH: Depression; mother  just a month ago;  feels she is under considerable stress  SKIN: negative for rash  GENITOURINARY: negative for dysuria  NEUROLOGIC:  see HPI    Allergies: Allergies   Allergen Reactions    Oxycodone      nausea, nightmares        Current Medications:   FLUoxetine (PROZAC) capsule 60 mg, Daily  sodium chloride flush 0.9 % injection 10 mL, 2 times per day  sodium chloride flush 0.9 % injection 10 mL, PRN  0.9 % sodium chloride infusion, PRN  enoxaparin (LOVENOX) injection 40 mg, Daily  promethazine (PHENERGAN) tablet 12.5 mg, Q6H PRN    Or  ondansetron (ZOFRAN) injection 4 mg, Q6H PRN  polyethylene glycol (GLYCOLAX) packet 17 g, Daily PRN  acetaminophen (TYLENOL) tablet 650 mg, Q6H PRN    Or  acetaminophen (TYLENOL) suppository 650 mg, Q6H PRN  lisinopril (PRINIVIL;ZESTRIL) tablet 10 mg, Daily    And  hydroCHLOROthiazide (HYDRODIURIL) tablet 12.5 mg, Daily       Medications Prior to Admission: lisinopril-hydroCHLOROthiazide (PRINZIDE;ZESTORETIC) 10-12.5 MG per tablet, Take 1 tablet by mouth daily  FLUoxetine (PROZAC) 20 MG capsule, Take 60 mg by mouth daily     Physical Exam:  BP (!) 146/78   Pulse 70   Temp 98.1 °F (36.7 °C) (Oral)   Resp 20   Ht 5' 7\" (1.702 m)   Wt 216 lb (98 kg)   SpO2 100%   BMI 33.83 kg/m²  I Body mass index is 33.83 kg/m². I   Wt Readings from Last 1 Encounters:   21 216 lb (98 kg)        GENERAL: she is in no apparent distress, and appears stated age; she did become tearful when I mentioned psychogenic in the differential diagnosis of the cause of her symptoms  EYE:  Fundi not examined due to the Covid-19 outbreak  CARDIOVASCULAR:  Heart regular rate and rhythm.   No carotid bruits detected. NEUROLOGIC:  Level of Alertness: alert  Orientation: oriented to person, place and time  Memory and Fund of Knowledge:  normal  Attention/Concentration: normal  Language: no aphasia  Cranial Nerves: pupils are equal; extraocular muscles intact; facial sensation intact to light touch; appears to have right lower facial weakness; hearing is intact to soft voice; the palate raises midline, and the tongue protrudes midline and she is able to push her tongue into either cheek symmetrically; shoulder shrug is symmetric  Motor Exam: Normal tone in all extremities. Strength is MRC grade 5 in all extremities bilaterally. Sensory: Sensory symmetric to light touch and position sense  Coordination: Cerebellar function is intact for the nose-finger-nose maneuver, and for rapid alternating movements; no rebound  Deep Tendon Reflexes: Reflexes are intact and bilaterally symmetric  Abnormal movements: none  Station and gait: Normal    Diagnostics:  CBC:   Lab Results   Component Value Date    WBC 5.9 04/05/2021    RBC 4.98 04/05/2021    HGB 14.2 04/05/2021    HCT 45.1 04/05/2021    MCV 90.6 04/05/2021    MCH 28.5 04/05/2021    MCHC 31.5 04/05/2021    RDW 12.5 04/05/2021     04/05/2021    MPV 11.6 04/05/2021     CMP:    Lab Results   Component Value Date     04/05/2021    K 4.0 04/05/2021     04/05/2021    CO2 28 04/05/2021    BUN 16 04/05/2021    CREATININE 0.7 04/05/2021    GFRAA >60 04/05/2021    LABGLOM >60 04/05/2021    GLUCOSE 102 04/05/2021    PROT 7.8 04/05/2021    LABALBU 4.7 04/05/2021    CALCIUM 10.1 04/05/2021    BILITOT 0.4 04/05/2021    ALKPHOS 101 04/05/2021    AST 18 04/05/2021    ALT 14 04/05/2021     LDL cholesterol 129    CT brain images reviewed: Normal  CTA showed no significant large vessel pathology      Impression:  Possible small stroke. She appears to have right lower facial weakness.   Differential diagnosis would include cervical spine pathology which could explain some but

## 2021-04-05 NOTE — PROGRESS NOTES
SPEECH/LANGUAGE PATHOLOGY  CLINICAL ASSESSMENT OF SWALLOWING FUNCTION    PATIENT NAME:  Candice Jones      :  1960      TODAY'S DATE:  2021  ROOM:  [address] [place] [address]    SUMMARY OF EVALUATION  Chart reviewed. Pt reports numbness/tingling in lips. Pt reports that she also feels that her voice is softer and that her speech is changed as compared to her baseline. Right sided facial weakness observed during oral motor exam.      DYSPHAGIA DIAGNOSIS:  Oral dysphagia; pharyngeal swallow WFL; however silent aspiration cannot be r/o at bedside. If silent aspiration is suspected, a video swallow study is recommended and requires a physician order. DIET RECOMMENDATIONS:  Regular consistency solids with  thin liquids    Pt may benefit from straw use to aid in labial seal, as well as placing food on left side of mouth      FEEDING RECOMMENDATIONS:     Assistance level:  No assistance needed      Compensatory strategies recommended: Small bites/sips, Alternate solids and liquids, use of straw to aid in labial seal     THERAPY RECOMMENDATIONS:      Meal endurance analysis 1-2 sessions to provide diet modification and compensatory strategy implementation due to need to ensure proper implementation of compensatory strategies during PO intake      A Speech, Language and Cognitive Evaluation is recommended and requires a physician order                PROCEDURE     Consistencies Administered During the Evaluation   Liquids: thin liquid   Solids:  pureed foods and solid foods      Method of Intake:   cup, straw, spoon  Self fed      Position:   Seated, upright                  RESULTS     Oral Stage:         Inadequate labial seal resulting anterior labial spillage on the right   Delayed A-P transit due to: reduced lingual strength       Pharyngeal Stage:      No signs of aspiration were noted during this evaluation however, silent aspiration cannot be ruled out at bedside.   If silent aspiration is suspected, a Videofluoroscopic Study of Swallowing (MBS) is recommended and requires a physician order. The Speech Language Pathologist (SLP) completed education with the patient regarding results of evaluation. Explained that Speech Pathology intervention is warranted  at this time   Prognosis for improvements is good     This plan will be re-evaluated and revised in 1 week  if warranted. Patient stated goals: Agreed with above,   Treatment goals discussed with Patient   The Patient understand(s) the diagnosis, prognosis and plan of care         INTERVENTION/EDUCATION    Pt educated on above results and plan of care. Pt trained on compensatory strategies for safe swallow with good outcome. Pt encouraged to engage in question/answer session. All questions answered and pt verbalized understanding of above. CPT code:  77009  bedside swallow eval, 04245 dysphagia therapy 15 mins      [x]The admitting diagnosis and active problem list, as listed below have been reviewed prior to initiation of this evaluation.      ADMITTING DIAGNOSIS: Stroke-like symptom [R29.90]     ACTIVE PROBLEM LIST:   Patient Active Problem List   Diagnosis    Stroke-like symptom

## 2021-04-05 NOTE — PROGRESS NOTES
Database complete. Medications reconciled. Care plans and education initiated. HX of bilateral mastectomy with restrictions to LUE.

## 2021-04-05 NOTE — CONSULTS
OTOLARYNGOLOGY  CONSULT NOTE  2021    Physician Consulted: Dr. Joe Perkins  Reason for Consult: Abnormal CTA neck findings  Referring Physician: Dr. Miki JIMENEZ  Candice Boston is a 61 y.o. female who ENT was consulted for evaluation of incidental findings found on CTA of the neck. Patient presented to the hospital today with a 2 day history of numbness/tingling in her perioral area with associated difficulty moving the tongue and right lower facial weakness. This has resulted in difficulty with drinking liquids and chewing food. States she is able to swallow okay. Denies history of Bell's palsy or strokes. She is a non-smoker. Denies recent illness, fever/chills, shortness of breath, visual changes, unintentional weight loss, change to voice or difficulty swallowing. ENT has been consulted for evaluation of abnormal findings that were noted on recent imaging. Review of Systems   Constitutional: Negative for chills and fever. HENT: Negative for trouble swallowing and voice change. Eyes: Negative. Respiratory: Negative. Cardiovascular: Negative. Gastrointestinal: Negative. Musculoskeletal: Negative. Skin: Negative for pallor and rash. Allergic/Immunologic: Negative. Neurological: Positive for facial asymmetry. Negative for dizziness. Psychiatric/Behavioral: Negative for agitation and behavioral problems. Past Medical History:   Diagnosis Date    Cancer [facility])     breast cancer       Past Surgical History:   Procedure Laterality Date     SECTION      HYSTERECTOMY      JOINT REPLACEMENT Left 2020    MASTECTOMY         Medications Prior to Admission:    Prior to Admission medications    Medication Sig Start Date End Date Taking?  Authorizing Provider   lisinopril-hydroCHLOROthiazide (PRINZIDE;ZESTORETIC) 10-12.5 MG per tablet Take 1 tablet by mouth daily   Yes Historical Provider, MD   FLUoxetine (PROZAC) 20 MG capsule Take 60 mg by mouth daily    Yes Historical Provider, MD       Allergies   Allergen Reactions    Oxycodone      nausea, nightmares       Family History   Problem Relation Age of Onset    High Blood Pressure Mother     Diabetes Mother     Lung Cancer Father     Breast Cancer Sister     No Known Problems Brother        Social History     Tobacco Use    Smoking status: Former Smoker     Packs/day: 0.10     Types: Cigarettes     Start date: 1976     Quit date: 1981     Years since quittin.0    Smokeless tobacco: Never Used   Substance Use Topics    Alcohol use: Yes     Comment: rare    Drug use: Never           PHYSICAL EXAM:    Vitals:    21 1345   BP: (!) 146/78   Pulse: 70   Resp: 20   Temp: 98.1 °F (36.7 °C)   SpO2: 100%       General Appearance:  Laying in bed, awake, alert, no apparent distress  Head/face:  NC/AT  Eyes: PERRL, EOMI  ENT: Bilateral external ears WNL, Nares patent, Septum midline,   Neck: Supple, no adenopathy appreciated  Lungs:  Non-labored, good respiratory effort, no stridor  Heart:  RR  Neuro: House Brackmann 1/6 on the left and 2/6 on right face with forehead spearing    Endoscopy Procedure Note    Pre-operative Diagnosis: right pharyngeal wall thickening  Post-operative Diagnosis: same  Indications: Hoarseness, dysphagia or aspiration - not able to be clearly evaluated by indirect laryngoscopy  Anesthesia: Lidocaine 2% and Kevin-Synephrine 1/2%  Endoscopy Type:  Flexible nasopharyngolaryngoscopy  Procedure Details   The bilateral side(s) of the nose was topically anesthetized with spray. The nasal cavities were inspected to determine which side would be more amenable to the scope being passed through. After waiting an appropriate period of time for anesthesia/ vasoconstriction to become effective, the flexible nasopharyngolaryngoscope was passed through the right side(s) of the nose, and the nose, nasopharynx, oropharynx, hypopharynx and larynx were examined.   Examination was performed during quiet respiration and with phonation. The following findings were noted. Findings:  Normal nasopharynx, normal epiglottis, normal tongue base, normal pyriform, normal TVC motion and mucosa, no subglottic masses or lesions. Condition:  Stable  Complications:  None    Media Information       Media Information       Media Information       Media Information           LABS:  CBC  Recent Labs     04/05/21  1010   WBC 5.9   HGB 14.2   HCT 45.1          RADIOLOGY  Ct Head Wo Contrast    Result Date: 4/5/2021  EXAMINATION: CT OF THE HEAD WITHOUT CONTRAST  4/5/2021 11:09 am TECHNIQUE: CT of the head was performed without the administration of intravenous contrast. Dose modulation, iterative reconstruction, and/or weight based adjustment of the mA/kV was utilized to reduce the radiation dose to as low as reasonably achievable. COMPARISON: 08/03/2006 HISTORY: ORDERING SYSTEM PROVIDED HISTORY: facial weakness/dropp, numbness TECHNOLOGIST PROVIDED HISTORY: Reason for exam:->facial weakness/dropp, numbness Has a \"code stroke\" or \"stroke alert\" been called? ->Yes Decision Support Exception->Emergency Medical Condition (MA) FINDINGS: BRAIN/VENTRICLES: There is no acute intracranial hemorrhage, mass effect or midline shift. No abnormal extra-axial fluid collection. The gray-white differentiation is maintained without evidence of an acute infarct. There is no evidence of hydrocephalus. ORBITS: The visualized portion of the orbits demonstrate no acute abnormality. SINUSES: The visualized paranasal sinuses and mastoid air cells demonstrate no acute abnormality. SOFT TISSUES/SKULL:  No acute abnormality of the visualized skull or soft tissues. No acute intracranial abnormality. Specifically, there is no mass or intracranial hemorrhage.      Cta Neck W Contrast    Result Date: 4/5/2021  EXAMINATION: CTA OF THE HEAD WITH CONTRAST; CTA OF THE NECK 4/5/2021 11:12 am: TECHNIQUE: CTA of the head/brain was performed with the administration of intravenous contrast. Multiplanar reformatted images are provided for review. MIP images are provided for review. Dose modulation, iterative reconstruction, and/or weight based adjustment of the mA/kV was utilized to reduce the radiation dose to as low as reasonably achievable.; CTA of the neck was performed with the administration of intravenous contrast. Multiplanar reformatted images are provided for review. MIP images are provided for review. Stenosis of the internal carotid arteries measured using NASCET criteria. Dose modulation, iterative reconstruction, and/or weight based adjustment of the mA/kV was utilized to reduce the radiation dose to as low as reasonably achievable. COMPARISON: CT head without contrast April 5, 2021. HISTORY: ORDERING SYSTEM PROVIDED HISTORY: facial weakness/numbness TECHNOLOGIST PROVIDED HISTORY: Reason for exam:->facial weakness/numbness Has a \"code stroke\" or \"stroke alert\" been called? ->Yes Decision Support Exception->Emergency Medical Condition (MA) FINDINGS: The bilateral common carotid arteries are normal in caliber and contour. There is mild calcified atherosclerotic plaque at the right carotid bulb without significant stenosis. There is mild noncalcified atherosclerotic plaques at the left carotid bulb without significant stenosis. The remaining bilateral internal carotid arteries are normal in caliber and contour. There is a hypoplastic right A1 DIANA segment, consistent with anatomic variation. Otherwise, the bilateral anterior, middle, and posterior cerebral arteries are unremarkable without significant stenosis. There is a mildly dominant left vertebral artery and short segment fenestration of the proximal basilar artery, also consistent with anatomic variations. Otherwise, the bilateral vertebral arteries and basilar artery unremarkable. There is no evidence for significant vascular stenosis or vessel occlusion.  There is no evidence for intracranial aneurysm or abnormal arteriovenous shunting. There is mild asymmetric prominence of the right palatine tonsil and mild nodular wall thickening on the right lateral oropharyngeal wall, best appreciated on axial image 104 and 113. There are multiple enlarged right level 3 cervical lymph nodes measuring up to 1.6 cm long axis and right level 4 cervical lymph node measuring up to 1.5 cm long axis. No evidence for significant vascular stenosis or vessel occlusion. No evidence for intracranial aneurysm. Mild nodular wall thickening of the right lateral or pharyngeal wall and mild asymmetric prominence of the right palatine tonsil. Right level 3 and level 4 cervical lymphadenopathy. ENT consultation is recommended. Cta Head W Contrast    Result Date: 4/5/2021  EXAMINATION: CTA OF THE HEAD WITH CONTRAST; CTA OF THE NECK 4/5/2021 11:12 am: TECHNIQUE: CTA of the head/brain was performed with the administration of intravenous contrast. Multiplanar reformatted images are provided for review. MIP images are provided for review. Dose modulation, iterative reconstruction, and/or weight based adjustment of the mA/kV was utilized to reduce the radiation dose to as low as reasonably achievable.; CTA of the neck was performed with the administration of intravenous contrast. Multiplanar reformatted images are provided for review. MIP images are provided for review. Stenosis of the internal carotid arteries measured using NASCET criteria. Dose modulation, iterative reconstruction, and/or weight based adjustment of the mA/kV was utilized to reduce the radiation dose to as low as reasonably achievable. COMPARISON: CT head without contrast April 5, 2021. HISTORY: ORDERING SYSTEM PROVIDED HISTORY: facial weakness/numbness TECHNOLOGIST PROVIDED HISTORY: Reason for exam:->facial weakness/numbness Has a \"code stroke\" or \"stroke alert\" been called? ->Yes Decision Support Exception->Emergency Medical Condition (MA) FINDINGS: The bilateral common carotid arteries are normal in caliber and contour. There is mild calcified atherosclerotic plaque at the right carotid bulb without significant stenosis. There is mild noncalcified atherosclerotic plaques at the left carotid bulb without significant stenosis. The remaining bilateral internal carotid arteries are normal in caliber and contour. There is a hypoplastic right A1 DIANA segment, consistent with anatomic variation. Otherwise, the bilateral anterior, middle, and posterior cerebral arteries are unremarkable without significant stenosis. There is a mildly dominant left vertebral artery and short segment fenestration of the proximal basilar artery, also consistent with anatomic variations. Otherwise, the bilateral vertebral arteries and basilar artery unremarkable. There is no evidence for significant vascular stenosis or vessel occlusion. There is no evidence for intracranial aneurysm or abnormal arteriovenous shunting. There is mild asymmetric prominence of the right palatine tonsil and mild nodular wall thickening on the right lateral oropharyngeal wall, best appreciated on axial image 104 and 113. There are multiple enlarged right level 3 cervical lymph nodes measuring up to 1.6 cm long axis and right level 4 cervical lymph node measuring up to 1.5 cm long axis. No evidence for significant vascular stenosis or vessel occlusion. No evidence for intracranial aneurysm. Mild nodular wall thickening of the right lateral or pharyngeal wall and mild asymmetric prominence of the right palatine tonsil. Right level 3 and level 4 cervical lymphadenopathy. ENT consultation is recommended. ASSESSMENT:  61 y.o. female with right sided facial weakness with forehead sparing and associated perioral numbness/tingling. Neurologic etiology more likely than Bell's palsy given forehead sparing.      PLAN:  · Currently being worked up and treated for suspected CVA  · Bedside swallow completed showed isolated oral dysphagia  · Pending MRI  · Scoped at bedside with findings noted above  · CTA neck reviewed and when correlated with scope exam findings are unremarkable  · No acute surgical intervention required at this time  · Continue current medical management per primary team  · Patient to follow up as outpatient with Dr. Valerie Ospina    Patient seen and examined by resident. Will discuss plan with attending.      Electronically signed by Vernon Haynes DO on 4/5/21 at 4:16 PM EDT

## 2021-04-05 NOTE — H&P
Procedure Laterality Date     SECTION      HYSTERECTOMY      JOINT REPLACEMENT Left 2020    MASTECTOMY         Medications Prior to Admission:    Prior to Admission medications    Medication Sig Start Date End Date Taking? Authorizing Provider   nitrofurantoin, macrocrystal-monohydrate, (MACROBID) 100 MG capsule Take 100 mg by mouth 2 times daily    Historical Provider, MD   Phenazopyridine HCl (AZO URINARY PAIN PO) Take by mouth 2 times daily    Historical Provider, MD   lisinopril (PRINIVIL;ZESTRIL) 10 MG tablet Take 10 mg by mouth daily    Historical Provider, MD   FLUoxetine HCl (PROZAC PO) Take 60 mg by mouth daily     Historical Provider, MD       Allergies:    Oxycodone    Social History:    reports that she has never smoked. She has never used smokeless tobacco. She reports current alcohol use. She reports that she does not use drugs. Denies tobacco, illicits, etoh    Family History:   family history is not on file. Mom: - DM complications  Dad:  pancreatic cancer  Multiple family members with breast cancer     PHYSICAL EXAM:  Vitals:  BP (!) 154/87   Pulse 68   Temp 98.2 °F (36.8 °C)   Resp 16   Ht 5' 7\" (1.702 m)   Wt 216 lb (98 kg)   SpO2 99%   BMI 33.83 kg/m²     General Appearance: alert and oriented to person, place and time and in no acute distress  Skin: warm and dry  Head: normocephalic and atraumati  Neck: neck supple and non tender without mass   Pulmonary/Chest: clear to auscultation bilaterally  Cardiovascular: normal rate, normal S1 and S2 and no carotid bruits  Abdomen: soft, non-tender, non-distended, normal bowel sounds, no masses or organomegaly  Extremities: no cyanosis, no clubbing and no edema. Strength 5/5 upper and lower  Neurologic: speech normal - pt has right sided facial droop. Noted on exam. Difficulty with raising right eyebrow and puffing out cheeks.         LABS:  Recent Labs     21  1008 21  1010   NA  --  139   K  -- 4.0   CL  --  102   CO2  --  28   BUN  --  16   CREATININE  --  0.7   GLUCOSE 100 102*   CALCIUM  --  10.1       Recent Labs     04/05/21  1010   WBC 5.9   RBC 4.98   HGB 14.2   HCT 45.1   MCV 90.6   MCH 28.5   MCHC 31.5*   RDW 12.5      MPV 11.6       No results for input(s): POCGLU in the last 72 hours. Radiology:   CTA HEAD W CONTRAST   Final Result   No evidence for significant vascular stenosis or vessel occlusion. No evidence for intracranial aneurysm. Mild nodular wall thickening of the right lateral or pharyngeal wall and mild   asymmetric prominence of the right palatine tonsil. Right level 3 and level   4 cervical lymphadenopathy. ENT consultation is recommended. CTA NECK W CONTRAST   Final Result   No evidence for significant vascular stenosis or vessel occlusion. No evidence for intracranial aneurysm. Mild nodular wall thickening of the right lateral or pharyngeal wall and mild   asymmetric prominence of the right palatine tonsil. Right level 3 and level   4 cervical lymphadenopathy. ENT consultation is recommended. CT Head WO Contrast   Final Result   No acute intracranial abnormality. Specifically, there is no mass or   intracranial hemorrhage. ASSESSMENT:      Active Problems:    Stroke-like symptom  Resolved Problems:    * No resolved hospital problems. *      PLAN:    1. Acute CVA vs bell's palsy: pt presents for inability to coordinate mouth. Reporting numbness in her lips starting on Friday. Described as \" fuzzy sensation\" in mouth and unable to control tongue. Associated numbness tingling in hands x 1 week. Also reporting blurred vision this am. She does have right sided facial droop on exam. With inability to raise right eye brow. strength 5/4 in extremities. Pt did have 1st Covid-10 vaccination 1 month prior. CTA neck/ head with no acute findings. Reporting h/o pre DM. ? HLD. Check hg a1c and lipid panel. Check MRI.  Neurology consult. 2. Lymphadenopathy right palatine tonsil and nodular wall thickening on the right lateral oropharyngeal wall: consult Ent for input. H/o tonsillectomy at age 1    1. HTN: on ace    4. H/o breast cancer        Code Status: FULL   DVT prophylaxis: lovenox      NOTE: This report was transcribed using voice recognition software. Every effort was made to ensure accuracy; however, inadvertent computerized transcription errors may be present.   Electronically signed by BEAR Monahan on 4/5/2021 at 12:30 PM

## 2021-04-05 NOTE — ED PROVIDER NOTES
Chief Complaint   Patient presents with    Numbness     onset friday to lips and tongue, states unable to control her mouth on sunday, difficulty eating, left arm numbness        Patient is a 68-year-old female presents today for numbness to her lips and tongue, as well as concern for left facial droop. She states symptom started on Friday for continued to progress. She denies recent fall, trauma or injury. No history of CVA. She is not on blood thinners. She is not had symptoms like that before in the past.  She not take any medication for the symptoms. Symptoms have been intermittent during this time. She denies recent fall, trauma or injury. She is alert and oriented x3, no acute distress. Denies numbness, tingling or weakness in extremities. The history is provided by the patient. Review of Systems   Constitutional: Negative for chills, diaphoresis and fever. HENT: Negative for ear pain, sinus pressure and sore throat. Eyes: Negative for pain, discharge and redness. Respiratory: Negative for cough, shortness of breath and wheezing. Cardiovascular: Negative for chest pain. Gastrointestinal: Negative for abdominal distention, abdominal pain, diarrhea, nausea and vomiting. Genitourinary: Negative for difficulty urinating, dysuria, flank pain and frequency. Musculoskeletal: Negative for arthralgias and back pain. Skin: Negative for rash and wound. Neurological: Positive for numbness. Negative for dizziness, syncope, weakness and headaches. Hematological: Negative for adenopathy. Psychiatric/Behavioral: Negative for behavioral problems and confusion. All other systems reviewed and are negative. Physical Exam  Vitals signs and nursing note reviewed. Constitutional:       Appearance: Normal appearance. She is well-developed. HENT:      Head: Normocephalic and atraumatic. Eyes:      Pupils: Pupils are equal, round, and reactive to light.    Neck: Musculoskeletal: Normal range of motion and neck supple. Cardiovascular:      Rate and Rhythm: Normal rate and regular rhythm. Pulses: Normal pulses. Heart sounds: Normal heart sounds. Pulmonary:      Effort: Pulmonary effort is normal. No respiratory distress. Breath sounds: Normal breath sounds. No wheezing or rales. Abdominal:      General: Bowel sounds are normal.      Palpations: Abdomen is soft. Tenderness: There is no abdominal tenderness. There is no guarding or rebound. Musculoskeletal:      Right lower leg: No edema. Left lower leg: No edema. Skin:     General: Skin is warm and dry. Capillary Refill: Capillary refill takes less than 2 seconds. Neurological:      General: No focal deficit present. Mental Status: She is alert and oriented to person, place, and time. Cranial Nerves: No cranial nerve deficit. Coordination: Coordination normal.      Comments: Muscle strength 5/5 in upper and lower extremities bilaterally  Sensation intact to light touch in upper and lower extremities bilaterally  Alert and oriented x3            Procedures     Labs Reviewed   CBC WITH AUTO DIFFERENTIAL - Abnormal; Notable for the following components:       Result Value    MCHC 31.5 (*)     Neutrophils % 41.3 (*)     Lymphocytes % 42.9 (*)     All other components within normal limits   COMPREHENSIVE METABOLIC PANEL W/ REFLEX TO MG FOR LOW K - Abnormal; Notable for the following components:    Glucose 102 (*)     All other components within normal limits   POCT GLUCOSE - Abnormal; Notable for the following components:    Meter Glucose 100 (*)     All other components within normal limits   POCT GLUCOSE - Normal   LACTIC ACID, PLASMA   HEMOGLOBIN A1C   LIPID PANEL     CTA HEAD W CONTRAST   Final Result   No evidence for significant vascular stenosis or vessel occlusion. No evidence for intracranial aneurysm.       Mild nodular wall thickening of the right lateral or pharyngeal wall and mild   asymmetric prominence of the right palatine tonsil. Right level 3 and level   4 cervical lymphadenopathy. ENT consultation is recommended. CTA NECK W CONTRAST   Final Result   No evidence for significant vascular stenosis or vessel occlusion. No evidence for intracranial aneurysm. Mild nodular wall thickening of the right lateral or pharyngeal wall and mild   asymmetric prominence of the right palatine tonsil. Right level 3 and level   4 cervical lymphadenopathy. ENT consultation is recommended. CT Head WO Contrast   Final Result   No acute intracranial abnormality. Specifically, there is no mass or   intracranial hemorrhage. MDM  Number of Diagnoses or Management Options  Numbness  Stroke-like symptom  Diagnosis management comments: Patient is a 10year-old female presents today for numbness and tingling to her face and lips. Symptoms of ongoing for the past 2 days. No other neurologic deficits noted. CTA of the head shows no intracranial abnormalities, they do note thickening of the right lateral pharyngeal wall, however this is not related to patient's current symptoms. Remainder of physical exam is normal.  Patient has intermittent strokelike symptoms with intermittent numbness, therefore we did recommend admission to the hospital for observation. She understands and agrees with plan. Vitals stable for admission. Amount and/or Complexity of Data Reviewed  Clinical lab tests: reviewed  Tests in the radiology section of CPT®: reviewed                  --------------------------------------------- PAST HISTORY ---------------------------------------------  Past Medical History:  has a past medical history of Cancer ([facility] [address] Past Surgical History:  has a past surgical history that includes Mastectomy; Hysterectomy;  section; and joint replacement (Left, 2020).     Social History:  reports that she quit smoking about 40 years ago. Her smoking use included cigarettes. She started smoking about 45 years ago. She smoked 0.10 packs per day. She has never used smokeless tobacco. She reports current alcohol use. She reports that she does not use drugs. Family History: family history includes Breast Cancer in her sister; Diabetes in her mother; High Blood Pressure in her mother; Lung Cancer in her father; No Known Problems in her brother. The patients home medications have been reviewed.     Allergies: Oxycodone    -------------------------------------------------- RESULTS -------------------------------------------------    LABS:  Results for orders placed or performed during the hospital encounter of 04/05/21   CBC auto differential   Result Value Ref Range    WBC 5.9 4.5 - 11.5 E9/L    RBC 4.98 3.50 - 5.50 E12/L    Hemoglobin 14.2 11.5 - 15.5 g/dL    Hematocrit 45.1 34.0 - 48.0 %    MCV 90.6 80.0 - 99.9 fL    MCH 28.5 26.0 - 35.0 pg    MCHC 31.5 (L) 32.0 - 34.5 %    RDW 12.5 11.5 - 15.0 fL    Platelets 630 851 - 137 E9/L    MPV 11.6 7.0 - 12.0 fL    Neutrophils % 41.3 (L) 43.0 - 80.0 %    Immature Granulocytes % 0.2 0.0 - 5.0 %    Lymphocytes % 42.9 (H) 20.0 - 42.0 %    Monocytes % 11.7 2.0 - 12.0 %    Eosinophils % 3.2 0.0 - 6.0 %    Basophils % 0.7 0.0 - 2.0 %    Neutrophils Absolute 2.44 1.80 - 7.30 E9/L    Immature Granulocytes # 0.01 E9/L    Lymphocytes Absolute 2.53 1.50 - 4.00 E9/L    Monocytes Absolute 0.69 0.10 - 0.95 E9/L    Eosinophils Absolute 0.19 0.05 - 0.50 E9/L    Basophils Absolute 0.04 0.00 - 0.20 E9/L   Comprehensive Metabolic Panel w/ Reflex to MG   Result Value Ref Range    Sodium 139 132 - 146 mmol/L    Potassium reflex Magnesium 4.0 3.5 - 5.0 mmol/L    Chloride 102 98 - 107 mmol/L    CO2 28 22 - 29 mmol/L    Anion Gap 9 7 - 16 mmol/L    Glucose 102 (H) 74 - 99 mg/dL    BUN 16 8 - 23 mg/dL    CREATININE 0.7 0.5 - 1.0 mg/dL    GFR Non-African American >60 >=60 mL/min/1.73    GFR African American >60 Calcium 10.1 8.6 - 10.2 mg/dL    Total Protein 7.8 6.4 - 8.3 g/dL    Albumin 4.7 3.5 - 5.2 g/dL    Total Bilirubin 0.4 0.0 - 1.2 mg/dL    Alkaline Phosphatase 101 35 - 104 U/L    ALT 14 0 - 32 U/L    AST 18 0 - 31 U/L   LACTIC ACID, PLASMA   Result Value Ref Range    Lactic Acid 1.4 0.5 - 2.2 mmol/L   POCT glucose   Result Value Ref Range    Glucose 100 mg/dL    QC OK? yes    POCT Glucose   Result Value Ref Range    Meter Glucose 100 (H) 74 - 99 mg/dL       RADIOLOGY:  CTA HEAD W CONTRAST   Final Result   No evidence for significant vascular stenosis or vessel occlusion. No evidence for intracranial aneurysm. Mild nodular wall thickening of the right lateral or pharyngeal wall and mild   asymmetric prominence of the right palatine tonsil. Right level 3 and level   4 cervical lymphadenopathy. ENT consultation is recommended. CTA NECK W CONTRAST   Final Result   No evidence for significant vascular stenosis or vessel occlusion. No evidence for intracranial aneurysm. Mild nodular wall thickening of the right lateral or pharyngeal wall and mild   asymmetric prominence of the right palatine tonsil. Right level 3 and level   4 cervical lymphadenopathy. ENT consultation is recommended. CT Head WO Contrast   Final Result   No acute intracranial abnormality. Specifically, there is no mass or   intracranial hemorrhage.                 ------------------------- NURSING NOTES AND VITALS REVIEWED ---------------------------  Date / Time Roomed:  4/5/2021  9:52 AM  ED Bed Assignment:  GROVER/GROVER    The nursing notes within the ED encounter and vital signs as below have been reviewed.      Patient Vitals for the past 24 hrs:   BP Temp Temp src Pulse Resp SpO2 Height Weight   04/05/21 1326 (!) 151/63 98.2 °F (36.8 °C) Oral 70 18 99 % -- --   04/05/21 1200 (!) 154/87 -- -- 68 16 99 % -- --   04/05/21 1006 138/67 98.2 °F (36.8 °C) -- 66 16 94 % 5' 7\" (1.702 m) 216 lb (98 kg)       Oxygen Saturation Interpretation: Normal    ------------------------------------------ PROGRESS NOTES ------------------------------------------    Counseling:  I have spoken with the patient and discussed todays results, in addition to providing specific details for the plan of care and counseling regarding the diagnosis and prognosis. Their questions are answered at this time and they are agreeable with the plan of admission.    --------------------------------- ADDITIONAL PROVIDER NOTES ---------------------------------  Consultations:  Spoke with Dr. Terra Bauer. Discussed case. They will admit the patient. This patient's ED course included: a personal history and physicial examination, re-evaluation prior to disposition and multiple bedside re-evaluations    This patient has remained hemodynamically stable during their ED course. Diagnosis:  1. Stroke-like symptom    2. Numbness        Disposition:  Patient's disposition: Admit to telemetry  Patient's condition is stable.              Andrew Tinsley DO  Resident  04/05/21 [id number]

## 2021-04-06 VITALS
OXYGEN SATURATION: 100 % | HEIGHT: 67 IN | SYSTOLIC BLOOD PRESSURE: 140 MMHG | WEIGHT: 212 LBS | DIASTOLIC BLOOD PRESSURE: 73 MMHG | HEART RATE: 65 BPM | BODY MASS INDEX: 33.27 KG/M2 | TEMPERATURE: 98.1 F | RESPIRATION RATE: 16 BRPM

## 2021-04-06 LAB
ANION GAP SERPL CALCULATED.3IONS-SCNC: 10 MMOL/L (ref 7–16)
BUN BLDV-MCNC: 16 MG/DL (ref 8–23)
CALCIUM SERPL-MCNC: 9.5 MG/DL (ref 8.6–10.2)
CHLORIDE BLD-SCNC: 102 MMOL/L (ref 98–107)
CO2: 24 MMOL/L (ref 22–29)
CREAT SERPL-MCNC: 0.6 MG/DL (ref 0.5–1)
GFR AFRICAN AMERICAN: >60
GFR NON-AFRICAN AMERICAN: >60 ML/MIN/1.73
GLUCOSE BLD-MCNC: 126 MG/DL (ref 74–99)
HCT VFR BLD CALC: 39.2 % (ref 34–48)
HEMOGLOBIN: 12.9 G/DL (ref 11.5–15.5)
MCH RBC QN AUTO: 29.4 PG (ref 26–35)
MCHC RBC AUTO-ENTMCNC: 32.9 % (ref 32–34.5)
MCV RBC AUTO: 89.3 FL (ref 80–99.9)
PDW BLD-RTO: 12.5 FL (ref 11.5–15)
PLATELET # BLD: 308 E9/L (ref 130–450)
PMV BLD AUTO: 11.8 FL (ref 7–12)
POTASSIUM REFLEX MAGNESIUM: 4 MMOL/L (ref 3.5–5)
RBC # BLD: 4.39 E12/L (ref 3.5–5.5)
SODIUM BLD-SCNC: 136 MMOL/L (ref 132–146)
WBC # BLD: 8.8 E9/L (ref 4.5–11.5)

## 2021-04-06 PROCEDURE — 85027 COMPLETE CBC AUTOMATED: CPT

## 2021-04-06 PROCEDURE — 2580000003 HC RX 258: Performed by: NURSE PRACTITIONER

## 2021-04-06 PROCEDURE — 6370000000 HC RX 637 (ALT 250 FOR IP): Performed by: NURSE PRACTITIONER

## 2021-04-06 PROCEDURE — 99225 PR SBSQ OBSERVATION CARE/DAY 25 MINUTES: CPT | Performed by: PSYCHIATRY & NEUROLOGY

## 2021-04-06 PROCEDURE — APPSS45 APP SPLIT SHARED TIME 31-45 MINUTES: Performed by: NURSE PRACTITIONER

## 2021-04-06 PROCEDURE — 99217 PR OBSERVATION CARE DISCHARGE MANAGEMENT: CPT | Performed by: FAMILY MEDICINE

## 2021-04-06 PROCEDURE — 86618 LYME DISEASE ANTIBODY: CPT

## 2021-04-06 PROCEDURE — 6360000002 HC RX W HCPCS: Performed by: NURSE PRACTITIONER

## 2021-04-06 PROCEDURE — 96372 THER/PROPH/DIAG INJ SC/IM: CPT

## 2021-04-06 PROCEDURE — 80048 BASIC METABOLIC PNL TOTAL CA: CPT

## 2021-04-06 PROCEDURE — 6370000000 HC RX 637 (ALT 250 FOR IP): Performed by: PSYCHIATRY & NEUROLOGY

## 2021-04-06 PROCEDURE — G0378 HOSPITAL OBSERVATION PER HR: HCPCS

## 2021-04-06 PROCEDURE — 36415 COLL VENOUS BLD VENIPUNCTURE: CPT

## 2021-04-06 RX ORDER — ATORVASTATIN CALCIUM 20 MG/1
20 TABLET, FILM COATED ORAL NIGHTLY
Qty: 30 TABLET | Refills: 0 | Status: SHIPPED | OUTPATIENT
Start: 2021-04-06 | End: 2021-05-06

## 2021-04-06 RX ORDER — PREDNISONE 20 MG/1
80 TABLET ORAL DAILY
Status: DISCONTINUED | OUTPATIENT
Start: 2021-04-06 | End: 2021-04-06 | Stop reason: HOSPADM

## 2021-04-06 RX ORDER — PREDNISONE 20 MG/1
80 TABLET ORAL DAILY
Qty: 24 TABLET | Refills: 0 | Status: SHIPPED | OUTPATIENT
Start: 2021-04-06 | End: 2021-04-12

## 2021-04-06 RX ORDER — ATORVASTATIN CALCIUM 20 MG/1
20 TABLET, FILM COATED ORAL NIGHTLY
Status: DISCONTINUED | OUTPATIENT
Start: 2021-04-06 | End: 2021-04-06 | Stop reason: HOSPADM

## 2021-04-06 RX ADMIN — ENOXAPARIN SODIUM 40 MG: 40 INJECTION SUBCUTANEOUS at 09:19

## 2021-04-06 RX ADMIN — PREDNISONE 80 MG: 20 TABLET ORAL at 14:07

## 2021-04-06 RX ADMIN — CLOPIDOGREL BISULFATE 75 MG: 75 TABLET ORAL at 09:19

## 2021-04-06 RX ADMIN — HYDROCHLOROTHIAZIDE 12.5 MG: 12.5 TABLET ORAL at 09:19

## 2021-04-06 RX ADMIN — LISINOPRIL 10 MG: 10 TABLET ORAL at 09:19

## 2021-04-06 RX ADMIN — Medication 10 ML: at 09:21

## 2021-04-06 RX ADMIN — FLUOXETINE 60 MG: 20 CAPSULE ORAL at 09:19

## 2021-04-06 RX ADMIN — ASPIRIN 81 MG CHEWABLE TABLET 81 MG: 81 TABLET CHEWABLE at 09:18

## 2021-04-06 ASSESSMENT — PAIN SCALES - GENERAL: PAINLEVEL_OUTOF10: 0

## 2021-04-06 NOTE — DISCHARGE SUMMARY
[facility] Physician Discharge Summary       Nacho Leon MD  [address] [facility] [address] [id number]    In 1 week      MD Trea [address]uns[address] [address]  [phone]    Schedule an appointment as soon as possible for a visit in 1 week  Make an appointment in 1 week to go over hospitalization, medications and follow up. Activity level: As tolerated     Dispo: HOME    Condition on discharge: Stable     Patient ID:  Dustin Hernandez  [id number]  89 y.o.  1960    Admit date: 4/5/2021    Discharge date and time:  4/6/2021  12:42 PM    Admission Diagnoses: Active Problems:    Stroke-like symptom  Resolved Problems:    * No resolved hospital problems. *      Discharge Diagnoses: Active Problems:    Stroke-like symptom  Resolved Problems:    * No resolved hospital problems. *      Consults:  IP CONSULT TO OTOLARYNGOLOGY  IP CONSULT TO NEUROLOGY    Procedures: none    Hospital Course:   Patient Dustin Hernandez is a 61 y.o. presented with Stroke-like symptom [R29.90]   Patient presented to the ER with complaints of inability to coordinate mouth and blurred vision. During hospitalization she was followed by neurology and ENT. She was treated for;    1. Acute CVA vs bell's palsy: pt presents for inability to coordinate mouth. Reporting numbness in her lips starting on Friday. Described as \" fuzzy sensation\" in mouth and unable to control tongue. Associated numbness tingling in hands x 1 week. Also reporting blurred vision this am. She does have right sided facial droop on exam. With inability to raise right eye brow. strength 5/4 in extremities. Pt did have 1st Covid-10 vaccination 1 month prior. CTA neck/ head with no acute findings. Reporting h/o pre DM. ? HLD. Check hg a1c 5.6 and lipid panel. . Speech eval. Lyme titer sent. MRI negative. Per neurology could have been a very tiny lacunar infarct.  However more likely bell's palsy. Prednisone ordered. Lipitor to be continued. Pt is to follow outpt with PCP in 1 week.     2. Lymphadenopathy right palatine tonsil and nodular wall thickening on the right lateral oropharyngeal wall: consult Ent for input. H/o tonsillectomy at age 3. ENT completed scope at bedside and reporting wnl.       3. HTN: on ace     4. H/o breast cancer         Pt still has slight facial droop with right mouth, eyelid. And eyebrow. Steroids to be started. She is to follow up with PCP in 1 week. She was discharged in stable condition with the following medications, instructions and follow up. Discharge Exam:  General Appearance: alert and oriented to person, place and time and in no acute distress  Skin: warm and dry  Head: normocephalic and atraumati  Neck: neck supple and non tender without mass   Pulmonary/Chest: clear to auscultation bilaterally  Cardiovascular: normal rate, normal S1 and S2 and no carotid bruits  Abdomen: soft, non-tender, non-distended, normal bowel sounds, no masses or organomegaly  Extremities: no cyanosis, no clubbing and no edema. Strength 5/5 upper and lower  Neurologic: speech normal - pt has right sided facial droop. Noted on exam. Difficulty with raising right eyebrow and puffing out cheeks.       I/O last 3 completed shifts: In: 480 [P.O.:480]  Out: -   No intake/output data recorded. LABS:  Recent Labs     04/05/21  1008 04/05/21  1010 04/06/21  0400   NA  --  139 136   K  --  4.0 4.0   CL  --  102 102   CO2  --  28 24   BUN  --  16 16   CREATININE  --  0.7 0.6   GLUCOSE 100 102* 126*   CALCIUM  --  10.1 9.5       Recent Labs     04/05/21  1010 04/06/21  0400   WBC 5.9 8.8   RBC 4.98 4.39   HGB 14.2 12.9   HCT 45.1 39.2   MCV 90.6 89.3   MCH 28.5 29.4   MCHC 31.5* 32.9   RDW 12.5 12.5    308   MPV 11.6 11.8       No results for input(s): POCGLU in the last 72 hours.     Imaging:  Ct Head Wo Contrast    Result Date: 4/5/2021  EXAMINATION: CT OF THE HEAD WITHOUT CONTRAST  4/5/2021 11:09 am TECHNIQUE: CT of the head was performed without the administration of intravenous contrast. Dose modulation, iterative reconstruction, and/or weight based adjustment of the mA/kV was utilized to reduce the radiation dose to as low as reasonably achievable. COMPARISON: 08/03/2006 HISTORY: ORDERING SYSTEM PROVIDED HISTORY: facial weakness/dropp, numbness TECHNOLOGIST PROVIDED HISTORY: Reason for exam:->facial weakness/dropp, numbness Has a \"code stroke\" or \"stroke alert\" been called? ->Yes Decision Support Exception->Emergency Medical Condition (MA) FINDINGS: BRAIN/VENTRICLES: There is no acute intracranial hemorrhage, mass effect or midline shift. No abnormal extra-axial fluid collection. The gray-white differentiation is maintained without evidence of an acute infarct. There is no evidence of hydrocephalus. ORBITS: The visualized portion of the orbits demonstrate no acute abnormality. SINUSES: The visualized paranasal sinuses and mastoid air cells demonstrate no acute abnormality. SOFT TISSUES/SKULL:  No acute abnormality of the visualized skull or soft tissues. No acute intracranial abnormality. Specifically, there is no mass or intracranial hemorrhage. Cta Neck W Contrast    Result Date: 4/5/2021  EXAMINATION: CTA OF THE HEAD WITH CONTRAST; CTA OF THE NECK 4/5/2021 11:12 am: TECHNIQUE: CTA of the head/brain was performed with the administration of intravenous contrast. Multiplanar reformatted images are provided for review. MIP images are provided for review. Dose modulation, iterative reconstruction, and/or weight based adjustment of the mA/kV was utilized to reduce the radiation dose to as low as reasonably achievable.; CTA of the neck was performed with the administration of intravenous contrast. Multiplanar reformatted images are provided for review. MIP images are provided for review. Stenosis of the internal carotid arteries measured using NASCET criteria. Dose modulation, iterative reconstruction, and/or weight based adjustment of the mA/kV was utilized to reduce the radiation dose to as low as reasonably achievable. COMPARISON: CT head without contrast April 5, 2021. HISTORY: ORDERING SYSTEM PROVIDED HISTORY: facial weakness/numbness TECHNOLOGIST PROVIDED HISTORY: Reason for exam:->facial weakness/numbness Has a \"code stroke\" or \"stroke alert\" been called? ->Yes Decision Support Exception->Emergency Medical Condition (MA) FINDINGS: The bilateral common carotid arteries are normal in caliber and contour. There is mild calcified atherosclerotic plaque at the right carotid bulb without significant stenosis. There is mild noncalcified atherosclerotic plaques at the left carotid bulb without significant stenosis. The remaining bilateral internal carotid arteries are normal in caliber and contour. There is a hypoplastic right A1 DIANA segment, consistent with anatomic variation. Otherwise, the bilateral anterior, middle, and posterior cerebral arteries are unremarkable without significant stenosis. There is a mildly dominant left vertebral artery and short segment fenestration of the proximal basilar artery, also consistent with anatomic variations. Otherwise, the bilateral vertebral arteries and basilar artery unremarkable. There is no evidence for significant vascular stenosis or vessel occlusion. There is no evidence for intracranial aneurysm or abnormal arteriovenous shunting. There is mild asymmetric prominence of the right palatine tonsil and mild nodular wall thickening on the right lateral oropharyngeal wall, best appreciated on axial image 104 and 113. There are multiple enlarged right level 3 cervical lymph nodes measuring up to 1.6 cm long axis and right level 4 cervical lymph node measuring up to 1.5 cm long axis. No evidence for significant vascular stenosis or vessel occlusion. No evidence for intracranial aneurysm.  Mild nodular wall thickening of the right lateral or pharyngeal wall and mild asymmetric prominence of the right palatine tonsil. Right level 3 and level 4 cervical lymphadenopathy. ENT consultation is recommended. Cta Head W Contrast    Result Date: 4/5/2021  EXAMINATION: CTA OF THE HEAD WITH CONTRAST; CTA OF THE NECK 4/5/2021 11:12 am: TECHNIQUE: CTA of the head/brain was performed with the administration of intravenous contrast. Multiplanar reformatted images are provided for review. MIP images are provided for review. Dose modulation, iterative reconstruction, and/or weight based adjustment of the mA/kV was utilized to reduce the radiation dose to as low as reasonably achievable.; CTA of the neck was performed with the administration of intravenous contrast. Multiplanar reformatted images are provided for review. MIP images are provided for review. Stenosis of the internal carotid arteries measured using NASCET criteria. Dose modulation, iterative reconstruction, and/or weight based adjustment of the mA/kV was utilized to reduce the radiation dose to as low as reasonably achievable. COMPARISON: CT head without contrast April 5, 2021. HISTORY: ORDERING SYSTEM PROVIDED HISTORY: facial weakness/numbness TECHNOLOGIST PROVIDED HISTORY: Reason for exam:->facial weakness/numbness Has a \"code stroke\" or \"stroke alert\" been called? ->Yes Decision Support Exception->Emergency Medical Condition (MA) FINDINGS: The bilateral common carotid arteries are normal in caliber and contour. There is mild calcified atherosclerotic plaque at the right carotid bulb without significant stenosis. There is mild noncalcified atherosclerotic plaques at the left carotid bulb without significant stenosis. The remaining bilateral internal carotid arteries are normal in caliber and contour. There is a hypoplastic right A1 DIANA segment, consistent with anatomic variation.   Otherwise, the bilateral anterior, middle, and posterior cerebral arteries are unremarkable without significant stenosis. There is a mildly dominant left vertebral artery and short segment fenestration of the proximal basilar artery, also consistent with anatomic variations. Otherwise, the bilateral vertebral arteries and basilar artery unremarkable. There is no evidence for significant vascular stenosis or vessel occlusion. There is no evidence for intracranial aneurysm or abnormal arteriovenous shunting. There is mild asymmetric prominence of the right palatine tonsil and mild nodular wall thickening on the right lateral oropharyngeal wall, best appreciated on axial image 104 and 113. There are multiple enlarged right level 3 cervical lymph nodes measuring up to 1.6 cm long axis and right level 4 cervical lymph node measuring up to 1.5 cm long axis. No evidence for significant vascular stenosis or vessel occlusion. No evidence for intracranial aneurysm. Mild nodular wall thickening of the right lateral or pharyngeal wall and mild asymmetric prominence of the right palatine tonsil. Right level 3 and level 4 cervical lymphadenopathy. ENT consultation is recommended. Mri Brain W Wo Contrast    Result Date: 4/5/2021  EXAMINATION: MRI OF THE BRAIN WITHOUT AND WITH CONTRAST  4/5/2021 5:27 pm TECHNIQUE: Multiplanar multisequence MRI of the head/brain was performed without and with the administration of intravenous contrast. COMPARISON: 04/05/2021 HISTORY: ORDERING SYSTEM PROVIDED HISTORY: stroke like symptoms TECHNOLOGIST PROVIDED HISTORY: Reason for exam:->stroke like symptoms FINDINGS: INTRACRANIAL STRUCTURES/VENTRICLES:  There is no acute infarct. No mass effect or midline shift. No evidence of an acute intracranial hemorrhage. Mild generalized involutional change. Minimal periventricular subcortical T2 prolongation suggestive chronic small vessel ischemic disease the ventricles and sulci are normal in size and configuration for patient's age. .  The sellar/suprasellar regions appear unremarkable.   The normal signal voids within the major intracranial vessels appear maintained. No abnormal focus of enhancement is seen within the brain. ORBITS: The visualized portion of the orbits demonstrate no acute abnormality. SINUSES: Mild paranasal sinus mucosal thickening. BONES/SOFT TISSUES: The bone marrow signal intensity appears normal. The soft tissues demonstrate no acute abnormality. Right level 2 adenopathy 7 mm short axis dimension. .. No acute stroke, midline shift, or mass effect. Minimal chronic small vessel changes. Pathologic right-sided adenopathy is partially visualized.        Patient Instructions:      Medication List      START taking these medications    atorvastatin 20 MG tablet  Commonly known as: LIPITOR  Take 1 tablet by mouth nightly     predniSONE 20 MG tablet  Commonly known as: DELTASONE  Take 4 tablets by mouth daily for 6 days        CONTINUE taking these medications    lisinopril-hydroCHLOROthiazide 10-12.5 MG per tablet  Commonly known as: PRINZIDE;ZESTORETIC     PROzac 20 MG capsule  Generic drug: FLUoxetine           Where to Get Your Medications      These medications were sent to [address] [place] [address] [phone]  [address] [place] [address]    Phone: [phone]   · atorvastatin 20 MG tablet  · predniSONE 20 MG tablet           Note that more than 30 minutes was spent in preparing discharge papers, discussing discharge with patient, medication review, etc.    Signed:  Electronically signed by BEAR Leo on 4/6/2021 at 12:42 PM

## 2021-04-06 NOTE — PROGRESS NOTES
Yesterday noted to have largely right lower face weakness. Difficulty getting food into her mouth. Suggesting neuro basis/event. Radiologic studies essentially unremarkable.  notes today right sided facial weakness, right eye tearing. No associated hearing loss    EXAM - no palpable parotid, facial mass. Weakness all divisions right facial nerve, mild forehead and eye. Oral/ramus branch weak. IMP - facial palsy, right , no defined etiology. Yesterday suggested neuro event, today more consistent with \"Bell\"s Palsy\", facial palsy unknown etiology. Would appreciate Neuro opinion as well. Consider steroids.  Anti-viral agents contoversial. Eye care discussed with pt

## 2021-04-06 NOTE — PROGRESS NOTES
Neurology Progress Note    Patient:  Timm Meckel      Unit/Bed:0624/0624-A    YOB: 1960    MRN: [id number]     Acct: [de-identified]     Admit date: 4/5/2021    Chief Complaint   Patient presents with    Numbness     onset friday to lips and tongue, states unable to control her mouth on sunday, difficulty eating, left arm numbness        Patient Seen, Chart, Physician notes, Labs, Radiology studies reviewed. Subjective:   Greater facial weakness on the right today, with some decrease in eye closure. No change in hearing, no vertigo, and the paresthesias have gone away. Past, Family, Social History unchanged from admission. Diet:  DIET GENERAL;    Medications:  Scheduled Meds:   atorvastatin  20 mg Oral Nightly    predniSONE  80 mg Oral Daily    FLUoxetine  60 mg Oral Daily    sodium chloride flush  10 mL Intravenous 2 times per day    enoxaparin  40 mg Subcutaneous Daily    lisinopril  10 mg Oral Daily    And    hydroCHLOROthiazide  12.5 mg Oral Daily     Continuous Infusions:   sodium chloride       PRN Meds:sodium chloride flush, sodium chloride, promethazine **OR** ondansetron, polyethylene glycol, acetaminophen **OR** acetaminophen    Objective:    Vitals: BP (!) 140/73   Pulse 65   Temp 98.1 °F (36.7 °C) (Oral)   Resp 16   Ht 5' 7\" (1.702 m)   Wt 212 lb (96.2 kg)   SpO2 100%   BMI 33.20 kg/m²   Physical Exam:  Alert and attentive. Language appropriate, with no aphasia. Pupils equal.  Extraocular muscles intact. Facial sensation intact to light touch. She has very poor effort for eye closure bilaterally as was the case yesterday but with spontaneous facial movements she does appear to have more weakness around the right eye today. There's certainly asymmetry. Greater right lower facial weakness. Upper extremity strength MRC grade 5, with normal tone. Light touch sensation intact in the limbs.   Rapid alternating fine finger movements and the nose-finger-nose maneuver are intact. 24 hour intake/output:    Intake/Output Summary (Last 24 hours) at 4/6/2021 1229  Last data filed at 4/5/2021 2031  Gross per 24 hour   Intake 480 ml   Output --   Net 480 ml     Last 3 weights: Wt Readings from Last 3 Encounters:   04/05/21 212 lb (96.2 kg)   03/11/21 201 lb (91.2 kg)   07/10/18 220 lb (99.8 kg)         CBC:   Recent Labs     04/05/21  1010 04/06/21  0400   WBC 5.9 8.8   HGB 14.2 12.9    308     BMP:    Recent Labs     04/05/21  1008 04/05/21  1010 04/06/21  0400   NA  --  139 136   K  --  4.0 4.0   CL  --  102 102   CO2  --  28 24   BUN  --  16 16   CREATININE  --  0.7 0.6   GLUCOSE 100 102* 126*     Calcium:  Recent Labs     04/06/21  0400   CALCIUM 9.5     Magnesium:No results for input(s): MG in the last 72 hours. Glucose:No results for input(s): POCGLU in the last 72 hours. HgbA1C:   Recent Labs     04/05/21  1010   LABA1C 5.6     Lipids:   Recent Labs     04/05/21  1010   CHOL 221*   TRIG 118   HDL 68   LDLCALC 129*       Radiology reports as per the Radiologist    Cta Head W Contrast    Result Date: 4/5/2021  EXAMINATION: CTA OF THE HEAD WITH CONTRAST; CTA OF THE NECK 4/5/2021 11:12 am: TECHNIQUE: CTA of the head/brain was performed with the administration of intravenous contrast. Multiplanar reformatted images are provided for review. MIP images are provided for review. Dose modulation, iterative reconstruction, and/or weight based adjustment of the mA/kV was utilized to reduce the radiation dose to as low as reasonably achievable.; CTA of the neck was performed with the administration of intravenous contrast. Multiplanar reformatted images are provided for review. MIP images are provided for review. Stenosis of the internal carotid arteries measured using NASCET criteria. Dose modulation, iterative reconstruction, and/or weight based adjustment of the mA/kV was utilized to reduce the radiation dose to as low as reasonably achievable.  COMPARISON: CT head without contrast April 5, 2021. HISTORY: ORDERING SYSTEM PROVIDED HISTORY: facial weakness/numbness TECHNOLOGIST PROVIDED HISTORY: Reason for exam:->facial weakness/numbness Has a \"code stroke\" or \"stroke alert\" been called? ->Yes Decision Support Exception->Emergency Medical Condition (MA) FINDINGS: The bilateral common carotid arteries are normal in caliber and contour. There is mild calcified atherosclerotic plaque at the right carotid bulb without significant stenosis. There is mild noncalcified atherosclerotic plaques at the left carotid bulb without significant stenosis. The remaining bilateral internal carotid arteries are normal in caliber and contour. There is a hypoplastic right A1 DIANA segment, consistent with anatomic variation. Otherwise, the bilateral anterior, middle, and posterior cerebral arteries are unremarkable without significant stenosis. There is a mildly dominant left vertebral artery and short segment fenestration of the proximal basilar artery, also consistent with anatomic variations. Otherwise, the bilateral vertebral arteries and basilar artery unremarkable. There is no evidence for significant vascular stenosis or vessel occlusion. There is no evidence for intracranial aneurysm or abnormal arteriovenous shunting. There is mild asymmetric prominence of the right palatine tonsil and mild nodular wall thickening on the right lateral oropharyngeal wall, best appreciated on axial image 104 and 113. There are multiple enlarged right level 3 cervical lymph nodes measuring up to 1.6 cm long axis and right level 4 cervical lymph node measuring up to 1.5 cm long axis. No evidence for significant vascular stenosis or vessel occlusion. No evidence for intracranial aneurysm. Mild nodular wall thickening of the right lateral or pharyngeal wall and mild asymmetric prominence of the right palatine tonsil. Right level 3 and level 4 cervical lymphadenopathy.   ENT consultation is recommended. Mri Brain W Wo Contrast    Result Date: 4/5/2021  EXAMINATION: MRI OF THE BRAIN WITHOUT AND WITH CONTRAST  4/5/2021 5:27 pm TECHNIQUE: Multiplanar multisequence MRI of the head/brain was performed without and with the administration of intravenous contrast. COMPARISON: 04/05/2021 HISTORY: ORDERING SYSTEM PROVIDED HISTORY: stroke like symptoms TECHNOLOGIST PROVIDED HISTORY: Reason for exam:->stroke like symptoms FINDINGS: INTRACRANIAL STRUCTURES/VENTRICLES:  There is no acute infarct. No mass effect or midline shift. No evidence of an acute intracranial hemorrhage. Mild generalized involutional change. Minimal periventricular subcortical T2 prolongation suggestive chronic small vessel ischemic disease the ventricles and sulci are normal in size and configuration for patient's age. .  The sellar/suprasellar regions appear unremarkable. The normal signal voids within the major intracranial vessels appear maintained. No abnormal focus of enhancement is seen within the brain. ORBITS: The visualized portion of the orbits demonstrate no acute abnormality. SINUSES: Mild paranasal sinus mucosal thickening. BONES/SOFT TISSUES: The bone marrow signal intensity appears normal. The soft tissues demonstrate no acute abnormality. Right level 2 adenopathy 7 mm short axis dimension. .. No acute stroke, midline shift, or mass effect. Minimal chronic small vessel changes. Pathologic right-sided adenopathy is partially visualized. (~ diffusion weighted images reviewed)                Assessment:    Active Problems:    Stroke-like symptom  Resolved Problems:    * No resolved hospital problems. *    Stroke unlikely with a negative MRI, though a very tiny lacunar infarct cannot be completely excluded. Much more likely this is Bell's palsy, perhaps with paresthesias explained by some hyperventilation/anxiety. Plan:  I am starting her on prednisone 80 mg daily for a week.   She is probably not affected enough to warrant treatment with an antiviral, though if symptoms increase in the next day or so, that could be considered. Due to her high LDL cholesterol, I encouraged her to stay on some Lipitor, and she is quite agreeable. I cut that back to 20 mg nightly and she should have a target LDL of less than 100. In the unlikely event this might be a tiny stroke, she could aim for an even lower LDL, and also consider daily baby aspirin or every other day baby aspirin. This was discussed at length with the patient and her .       Electronically signed by Choco Pacheco DO on 4/6/2021 at 12:29 PM    Neurology

## 2021-04-06 NOTE — CARE COORDINATION
CM NOTE: Per QFR--- observation stay for continued monitoring & evaluation stroke. From home with  & plan is return home with . ENT eval for possible difficulty swallowing was wnl.     CM attempted to meet with pt this afternoon but was discharged home. No needs per staff.

## 2021-04-08 LAB — LYME, EIA: 0.11 LIV (ref 0–1.2)

## 2023-12-28 ENCOUNTER — APPOINTMENT (OUTPATIENT)
Dept: ULTRASOUND IMAGING | Age: 63
End: 2023-12-28
Payer: COMMERCIAL

## 2023-12-28 ENCOUNTER — HOSPITAL ENCOUNTER (EMERGENCY)
Age: 63
Discharge: HOME | End: 2023-12-28
Payer: COMMERCIAL

## 2023-12-28 VITALS
WEIGHT: 220 LBS | DIASTOLIC BLOOD PRESSURE: 68 MMHG | SYSTOLIC BLOOD PRESSURE: 129 MMHG | HEIGHT: 67 IN | HEART RATE: 66 BPM | BODY MASS INDEX: 34.53 KG/M2 | TEMPERATURE: 97.6 F | OXYGEN SATURATION: 94 % | RESPIRATION RATE: 16 BRPM

## 2023-12-28 DIAGNOSIS — M71.22 SYNOVIAL CYST OF LEFT POPLITEAL SPACE: Primary | ICD-10-CM

## 2023-12-28 PROCEDURE — 99284 EMERGENCY DEPT VISIT MOD MDM: CPT

## 2023-12-28 PROCEDURE — 93971 EXTREMITY STUDY: CPT

## 2023-12-28 RX ORDER — IBUPROFEN 600 MG/1
600 TABLET ORAL 3 TIMES DAILY PRN
Qty: 30 TABLET | Refills: 0 | Status: SHIPPED | OUTPATIENT
Start: 2023-12-28

## 2023-12-28 NOTE — ED NOTES
Department of Emergency Medicine    FIRST PROVIDER TRIAGE NOTE             Independent MLP           12/28/23  2:28 PM EST    Date of Encounter: 12/28/23   MRN: [id number]    There were no vitals filed for this visit. HPI: Cassandra Yu is a 61 y.o. female who presents to the ED for Leg Pain (Left left/knee pain, no known injury, concerned for blood clot)   Bruising and mild swelling x 1 week. No history of dvt or pe   denies anticoagulation use    ROS: Negative for fever. Physical Exam:   Gen Appearance/Constitutional: alert  Musculoskeletal: moves all extremities x 4     Initial Plan of Care: All treatment areas with department are currently occupied. Plan to order/Initiate the following while awaiting opening in ED: Triage evaluation  ultrasound.     Initial Plan of Care: Initiate Treatment-Testing, Proceed toTreatment Area When Bed Available for ED Attending/MLP to Continue Care    Electronically signed by GINNA Handley CNP   DD: 12/28/23       GINNA Handley CNP  12/28/23 [id number]

## 2023-12-28 NOTE — ED PROVIDER NOTES
condition is stable    Discharge Instructions:   Patient referred to  Call your orthopedist for follow-up          NEW MEDICATIONS     Discharge Medication List as of 12/28/2023  4:12 PM        START taking these medications    Details   ibuprofen (ADVIL;MOTRIN) 600 MG tablet Take 1 tablet by mouth 3 times daily as needed for Pain, Disp-30 tablet, R-0Normal           Electronically signed by Alex Mays PA-C   DD: 12/28/23  **This report was transcribed using voice recognition software. Every effort was made to ensure accuracy; however, inadvertent computerized transcription errors may be present.   END OF ED PROVIDER NOTE       Alex Mays PA-C  12/28/23 [id number]

## 2024-05-06 ENCOUNTER — HOSPITAL ENCOUNTER (OUTPATIENT)
Age: 64
Discharge: HOME | End: 2024-05-08

## 2024-05-06 PROCEDURE — 88305 TISSUE EXAM BY PATHOLOGIST: CPT

## 2024-05-09 LAB — SURGICAL PATHOLOGY REPORT: NORMAL

## 2025-05-29 ENCOUNTER — OFFICE VISIT (OUTPATIENT)
Age: 65
End: 2025-05-29
Payer: COMMERCIAL

## 2025-05-29 DIAGNOSIS — M43.16 SPONDYLOLISTHESIS OF LUMBAR REGION: Primary | ICD-10-CM

## 2025-05-29 PROCEDURE — 99204 OFFICE O/P NEW MOD 45 MIN: CPT | Performed by: NEUROLOGICAL SURGERY

## 2025-05-29 ASSESSMENT — ENCOUNTER SYMPTOMS
ALLERGIC/IMMUNOLOGIC NEGATIVE: 1
RESPIRATORY NEGATIVE: 1
BACK PAIN: 1
EYES NEGATIVE: 1
GASTROINTESTINAL NEGATIVE: 1

## 2025-05-29 NOTE — PROGRESS NOTES
Exam  Vitals reviewed.   Constitutional:       General: She is not in acute distress.     Appearance: Normal appearance. She is normal weight. She is not ill-appearing, toxic-appearing or diaphoretic.   HENT:      Head: Normocephalic and atraumatic.      Nose: No congestion or rhinorrhea.      Mouth/Throat:      Mouth: Mucous membranes are moist.      Pharynx: Oropharynx is clear. No oropharyngeal exudate or posterior oropharyngeal erythema.   Eyes:      General: No visual field deficit or scleral icterus.        Right eye: No discharge.         Left eye: No discharge.      Extraocular Movements: Extraocular movements intact.      Conjunctiva/sclera: Conjunctivae normal.      Pupils: Pupils are equal, round, and reactive to light.   Abdominal:      General: Abdomen is flat. There is no distension.   Musculoskeletal:         General: No swelling, tenderness, deformity or signs of injury. Normal range of motion.      Right lower leg: No edema.      Left lower leg: No edema.   Skin:     Capillary Refill: Capillary refill takes less than 2 seconds.      Coloration: Skin is not jaundiced or pale.      Findings: No bruising, erythema, lesion or rash.   Neurological:      Mental Status: She is alert and oriented to person, place, and time.      GCS: GCS eye subscore is 4. GCS verbal subscore is 5. GCS motor subscore is 6.      Cranial Nerves: No cranial nerve deficit, dysarthria or facial asymmetry.      Sensory: Sensory deficit present.      Motor: Weakness present. No tremor, atrophy, abnormal muscle tone, seizure activity or pronator drift.      Coordination: Coordination is intact. Romberg sign negative. Coordination normal. Finger-Nose-Finger Test and Heel to Shin Test normal.      Gait: Gait normal.      Deep Tendon Reflexes: Reflexes normal. Babinski sign absent on the right side. Babinski sign absent on the left side.      Reflex Scores:       Tricep reflexes are 2+ on the right side and 2+ on the left side.

## 2025-08-05 ENCOUNTER — OFFICE VISIT (OUTPATIENT)
Age: 65
End: 2025-08-05
Payer: COMMERCIAL

## 2025-08-05 VITALS
WEIGHT: 233.8 LBS | BODY MASS INDEX: 37.57 KG/M2 | HEIGHT: 66 IN | TEMPERATURE: 97.2 F | DIASTOLIC BLOOD PRESSURE: 77 MMHG | SYSTOLIC BLOOD PRESSURE: 124 MMHG | HEART RATE: 72 BPM

## 2025-08-05 DIAGNOSIS — E66.812 CLASS 2 SEVERE OBESITY DUE TO EXCESS CALORIES WITH SERIOUS COMORBIDITY AND BODY MASS INDEX (BMI) OF 38.0 TO 38.9 IN ADULT (HCC): ICD-10-CM

## 2025-08-05 DIAGNOSIS — M54.41 CHRONIC RIGHT-SIDED LOW BACK PAIN WITH RIGHT-SIDED SCIATICA: Primary | ICD-10-CM

## 2025-08-05 DIAGNOSIS — G89.29 CHRONIC RIGHT-SIDED LOW BACK PAIN WITH RIGHT-SIDED SCIATICA: Primary | ICD-10-CM

## 2025-08-05 DIAGNOSIS — E66.01 CLASS 2 SEVERE OBESITY DUE TO EXCESS CALORIES WITH SERIOUS COMORBIDITY AND BODY MASS INDEX (BMI) OF 38.0 TO 38.9 IN ADULT (HCC): ICD-10-CM

## 2025-08-05 PROCEDURE — 99205 OFFICE O/P NEW HI 60 MIN: CPT | Performed by: INTERNAL MEDICINE

## 2025-08-05 PROCEDURE — 99417 PROLNG OP E/M EACH 15 MIN: CPT | Performed by: INTERNAL MEDICINE

## 2025-08-05 PROCEDURE — 3074F SYST BP LT 130 MM HG: CPT | Performed by: INTERNAL MEDICINE

## 2025-08-05 PROCEDURE — 3078F DIAST BP <80 MM HG: CPT | Performed by: INTERNAL MEDICINE

## 2025-08-05 RX ORDER — PHENTERMINE HYDROCHLORIDE 37.5 MG/1
TABLET ORAL
Qty: 30 TABLET | Refills: 0 | Status: SHIPPED | OUTPATIENT
Start: 2025-08-05 | End: 2025-09-04

## 2025-09-04 ENCOUNTER — OFFICE VISIT (OUTPATIENT)
Age: 65
End: 2025-09-04
Payer: COMMERCIAL

## 2025-09-04 VITALS
DIASTOLIC BLOOD PRESSURE: 77 MMHG | SYSTOLIC BLOOD PRESSURE: 135 MMHG | WEIGHT: 229.8 LBS | HEIGHT: 66 IN | BODY MASS INDEX: 36.93 KG/M2 | TEMPERATURE: 97.2 F | HEART RATE: 74 BPM

## 2025-09-04 DIAGNOSIS — M54.41 CHRONIC RIGHT-SIDED LOW BACK PAIN WITH RIGHT-SIDED SCIATICA: Primary | ICD-10-CM

## 2025-09-04 DIAGNOSIS — G89.29 CHRONIC RIGHT-SIDED LOW BACK PAIN WITH RIGHT-SIDED SCIATICA: Primary | ICD-10-CM

## 2025-09-04 DIAGNOSIS — E66.01 CLASS 2 SEVERE OBESITY DUE TO EXCESS CALORIES WITH SERIOUS COMORBIDITY AND BODY MASS INDEX (BMI) OF 38.0 TO 38.9 IN ADULT (HCC): ICD-10-CM

## 2025-09-04 DIAGNOSIS — E66.812 CLASS 2 SEVERE OBESITY DUE TO EXCESS CALORIES WITH SERIOUS COMORBIDITY AND BODY MASS INDEX (BMI) OF 38.0 TO 38.9 IN ADULT (HCC): ICD-10-CM

## 2025-09-04 PROCEDURE — 99214 OFFICE O/P EST MOD 30 MIN: CPT | Performed by: INTERNAL MEDICINE

## 2025-09-04 PROCEDURE — 3078F DIAST BP <80 MM HG: CPT | Performed by: INTERNAL MEDICINE

## 2025-09-04 PROCEDURE — 3075F SYST BP GE 130 - 139MM HG: CPT | Performed by: INTERNAL MEDICINE

## 2025-09-04 RX ORDER — PHENTERMINE HYDROCHLORIDE 37.5 MG/1
TABLET ORAL
Qty: 30 TABLET | Refills: 0 | Status: SHIPPED | OUTPATIENT
Start: 2025-09-04 | End: 2025-10-04